# Patient Record
Sex: FEMALE | Race: WHITE | NOT HISPANIC OR LATINO | URBAN - METROPOLITAN AREA
[De-identification: names, ages, dates, MRNs, and addresses within clinical notes are randomized per-mention and may not be internally consistent; named-entity substitution may affect disease eponyms.]

---

## 2017-04-20 ENCOUNTER — OUTPATIENT (OUTPATIENT)
Dept: OUTPATIENT SERVICES | Facility: HOSPITAL | Age: 47
LOS: 1 days | End: 2017-04-20
Payer: COMMERCIAL

## 2017-04-20 PROCEDURE — G0204: CPT | Mod: 26

## 2017-04-20 PROCEDURE — 76641 ULTRASOUND BREAST COMPLETE: CPT | Mod: 26,50

## 2017-04-20 PROCEDURE — 77066 DX MAMMO INCL CAD BI: CPT

## 2017-04-20 PROCEDURE — 76641 ULTRASOUND BREAST COMPLETE: CPT

## 2017-04-27 ENCOUNTER — RESULT REVIEW (OUTPATIENT)
Age: 47
End: 2017-04-27

## 2017-04-28 ENCOUNTER — OUTPATIENT (OUTPATIENT)
Dept: OUTPATIENT SERVICES | Facility: HOSPITAL | Age: 47
LOS: 1 days | End: 2017-04-28
Payer: COMMERCIAL

## 2017-04-28 PROCEDURE — 88305 TISSUE EXAM BY PATHOLOGIST: CPT

## 2017-04-28 PROCEDURE — 19081 BX BREAST 1ST LESION STRTCTC: CPT

## 2017-04-28 PROCEDURE — A4648: CPT

## 2017-04-28 PROCEDURE — 19081 BX BREAST 1ST LESION STRTCTC: CPT | Mod: RT

## 2017-05-02 LAB — SURGICAL PATHOLOGY STUDY: SIGNIFICANT CHANGE UP

## 2018-04-27 ENCOUNTER — EMERGENCY (EMERGENCY)
Facility: HOSPITAL | Age: 48
LOS: 1 days | Discharge: HOME | End: 2018-04-27
Attending: EMERGENCY MEDICINE | Admitting: EMERGENCY MEDICINE
Payer: COMMERCIAL

## 2018-04-27 VITALS
SYSTOLIC BLOOD PRESSURE: 132 MMHG | HEART RATE: 82 BPM | TEMPERATURE: 98 F | RESPIRATION RATE: 16 BRPM | WEIGHT: 167.55 LBS | HEIGHT: 67 IN | OXYGEN SATURATION: 99 % | DIASTOLIC BLOOD PRESSURE: 83 MMHG

## 2018-04-27 DIAGNOSIS — J02.9 ACUTE PHARYNGITIS, UNSPECIFIED: ICD-10-CM

## 2018-04-27 DIAGNOSIS — B34.9 VIRAL INFECTION, UNSPECIFIED: ICD-10-CM

## 2018-04-27 LAB
HMPV RNA SPEC QL NAA+PROBE: DETECTED
RAPID RVP RESULT: DETECTED

## 2018-04-27 PROCEDURE — 99283 EMERGENCY DEPT VISIT LOW MDM: CPT

## 2018-04-27 PROCEDURE — 87798 DETECT AGENT NOS DNA AMP: CPT

## 2018-04-27 PROCEDURE — 87581 M.PNEUMON DNA AMP PROBE: CPT

## 2018-04-27 PROCEDURE — 87633 RESP VIRUS 12-25 TARGETS: CPT

## 2018-04-27 PROCEDURE — 87486 CHLMYD PNEUM DNA AMP PROBE: CPT

## 2018-04-27 NOTE — ED PROVIDER NOTE - OBJECTIVE STATEMENT
47 F no pmh not on medications co cold sx- sore throat cough runny nose malaise weakness  no f/c + ill contacts- 4 yr old son w fever at home  no exac/allev factors  moderate severity

## 2018-07-19 ENCOUNTER — APPOINTMENT (OUTPATIENT)
Dept: ORTHOPEDIC SURGERY | Facility: CLINIC | Age: 48
End: 2018-07-19
Payer: COMMERCIAL

## 2018-07-19 VITALS — HEIGHT: 66.93 IN | RESPIRATION RATE: 16 BRPM | WEIGHT: 167.55 LBS | BODY MASS INDEX: 26.3 KG/M2

## 2018-07-19 DIAGNOSIS — Z78.9 OTHER SPECIFIED HEALTH STATUS: ICD-10-CM

## 2018-07-19 DIAGNOSIS — M18.11 UNILATERAL PRIMARY OSTEOARTHRITIS OF FIRST CARPOMETACARPAL JOINT, RIGHT HAND: ICD-10-CM

## 2018-07-19 PROCEDURE — 99203 OFFICE O/P NEW LOW 30 MIN: CPT

## 2018-07-19 PROCEDURE — 73110 X-RAY EXAM OF WRIST: CPT | Mod: 50

## 2018-08-23 ENCOUNTER — RESULT REVIEW (OUTPATIENT)
Age: 48
End: 2018-08-23

## 2019-07-15 ENCOUNTER — TRANSCRIPTION ENCOUNTER (OUTPATIENT)
Age: 49
End: 2019-07-15

## 2019-12-02 ENCOUNTER — RESULT REVIEW (OUTPATIENT)
Age: 49
End: 2019-12-02

## 2020-04-25 ENCOUNTER — MESSAGE (OUTPATIENT)
Age: 50
End: 2020-04-25

## 2020-05-03 ENCOUNTER — APPOINTMENT (OUTPATIENT)
Dept: DISASTER EMERGENCY | Facility: HOSPITAL | Age: 50
End: 2020-05-03

## 2020-05-04 LAB
SARS-COV-2 IGG SERPL IA-ACNC: 1 INDEX
SARS-COV-2 IGG SERPL QL IA: NEGATIVE

## 2020-10-02 ENCOUNTER — RESULT REVIEW (OUTPATIENT)
Age: 50
End: 2020-10-02

## 2020-11-30 ENCOUNTER — EMERGENCY (EMERGENCY)
Facility: HOSPITAL | Age: 50
LOS: 1 days | Discharge: HOME | End: 2020-11-30
Attending: EMERGENCY MEDICINE | Admitting: EMERGENCY MEDICINE
Payer: COMMERCIAL

## 2020-11-30 VITALS
RESPIRATION RATE: 18 BRPM | TEMPERATURE: 98 F | HEART RATE: 107 BPM | OXYGEN SATURATION: 98 % | SYSTOLIC BLOOD PRESSURE: 150 MMHG | HEIGHT: 67 IN | DIASTOLIC BLOOD PRESSURE: 88 MMHG

## 2020-11-30 VITALS
HEART RATE: 87 BPM | RESPIRATION RATE: 16 BRPM | TEMPERATURE: 98 F | SYSTOLIC BLOOD PRESSURE: 135 MMHG | DIASTOLIC BLOOD PRESSURE: 67 MMHG | OXYGEN SATURATION: 96 %

## 2020-11-30 DIAGNOSIS — Z20.828 CONTACT WITH AND (SUSPECTED) EXPOSURE TO OTHER VIRAL COMMUNICABLE DISEASES: ICD-10-CM

## 2020-11-30 DIAGNOSIS — E83.52 HYPERCALCEMIA: ICD-10-CM

## 2020-11-30 DIAGNOSIS — R07.89 OTHER CHEST PAIN: ICD-10-CM

## 2020-11-30 DIAGNOSIS — J18.9 PNEUMONIA, UNSPECIFIED ORGANISM: ICD-10-CM

## 2020-11-30 LAB
ALBUMIN SERPL ELPH-MCNC: 4.2 G/DL — SIGNIFICANT CHANGE UP (ref 3.3–5)
ALP SERPL-CCNC: 76 U/L — SIGNIFICANT CHANGE UP (ref 40–120)
ALT FLD-CCNC: 18 U/L — SIGNIFICANT CHANGE UP (ref 10–45)
ANION GAP SERPL CALC-SCNC: 12 MMOL/L — SIGNIFICANT CHANGE UP (ref 5–17)
AST SERPL-CCNC: 13 U/L — SIGNIFICANT CHANGE UP (ref 10–40)
BASOPHILS # BLD AUTO: 0.05 K/UL — SIGNIFICANT CHANGE UP (ref 0–0.2)
BASOPHILS NFR BLD AUTO: 0.4 % — SIGNIFICANT CHANGE UP (ref 0–2)
BILIRUB SERPL-MCNC: 0.3 MG/DL — SIGNIFICANT CHANGE UP (ref 0.2–1.2)
BUN SERPL-MCNC: 15 MG/DL — SIGNIFICANT CHANGE UP (ref 7–23)
CALCIUM SERPL-MCNC: 11.2 MG/DL — HIGH (ref 8.4–10.5)
CHLORIDE SERPL-SCNC: 106 MMOL/L — SIGNIFICANT CHANGE UP (ref 96–108)
CO2 SERPL-SCNC: 21 MMOL/L — LOW (ref 22–31)
CREAT SERPL-MCNC: 0.77 MG/DL — SIGNIFICANT CHANGE UP (ref 0.5–1.3)
EOSINOPHIL # BLD AUTO: 0.6 K/UL — HIGH (ref 0–0.5)
EOSINOPHIL NFR BLD AUTO: 5.1 % — SIGNIFICANT CHANGE UP (ref 0–6)
GLUCOSE SERPL-MCNC: 109 MG/DL — HIGH (ref 70–99)
HCG SERPL-ACNC: <0 MIU/ML — SIGNIFICANT CHANGE UP
HCT VFR BLD CALC: 34 % — LOW (ref 34.5–45)
HGB BLD-MCNC: 10.7 G/DL — LOW (ref 11.5–15.5)
IMM GRANULOCYTES NFR BLD AUTO: 0.4 % — SIGNIFICANT CHANGE UP (ref 0–1.5)
LYMPHOCYTES # BLD AUTO: 1.5 K/UL — SIGNIFICANT CHANGE UP (ref 1–3.3)
LYMPHOCYTES # BLD AUTO: 12.8 % — LOW (ref 13–44)
MCHC RBC-ENTMCNC: 26.1 PG — LOW (ref 27–34)
MCHC RBC-ENTMCNC: 31.5 GM/DL — LOW (ref 32–36)
MCV RBC AUTO: 82.9 FL — SIGNIFICANT CHANGE UP (ref 80–100)
MONOCYTES # BLD AUTO: 0.66 K/UL — SIGNIFICANT CHANGE UP (ref 0–0.9)
MONOCYTES NFR BLD AUTO: 5.6 % — SIGNIFICANT CHANGE UP (ref 2–14)
NEUTROPHILS # BLD AUTO: 8.84 K/UL — HIGH (ref 1.8–7.4)
NEUTROPHILS NFR BLD AUTO: 75.7 % — SIGNIFICANT CHANGE UP (ref 43–77)
NRBC # BLD: 0 /100 WBCS — SIGNIFICANT CHANGE UP (ref 0–0)
NT-PROBNP SERPL-SCNC: 21 PG/ML — SIGNIFICANT CHANGE UP (ref 0–300)
PLATELET # BLD AUTO: 193 K/UL — SIGNIFICANT CHANGE UP (ref 150–400)
POTASSIUM SERPL-MCNC: 4.2 MMOL/L — SIGNIFICANT CHANGE UP (ref 3.5–5.3)
POTASSIUM SERPL-SCNC: 4.2 MMOL/L — SIGNIFICANT CHANGE UP (ref 3.5–5.3)
PROT SERPL-MCNC: 7.3 G/DL — SIGNIFICANT CHANGE UP (ref 6–8.3)
RBC # BLD: 4.1 M/UL — SIGNIFICANT CHANGE UP (ref 3.8–5.2)
RBC # FLD: 13.9 % — SIGNIFICANT CHANGE UP (ref 10.3–14.5)
SARS-COV-2 RNA SPEC QL NAA+PROBE: SIGNIFICANT CHANGE UP
SODIUM SERPL-SCNC: 139 MMOL/L — SIGNIFICANT CHANGE UP (ref 135–145)
TROPONIN T SERPL-MCNC: <0.01 NG/ML — SIGNIFICANT CHANGE UP (ref 0–0.01)
WBC # BLD: 11.7 K/UL — HIGH (ref 3.8–10.5)
WBC # FLD AUTO: 11.7 K/UL — HIGH (ref 3.8–10.5)

## 2020-11-30 PROCEDURE — 84702 CHORIONIC GONADOTROPIN TEST: CPT

## 2020-11-30 PROCEDURE — 93005 ELECTROCARDIOGRAM TRACING: CPT

## 2020-11-30 PROCEDURE — 84484 ASSAY OF TROPONIN QUANT: CPT

## 2020-11-30 PROCEDURE — 99284 EMERGENCY DEPT VISIT MOD MDM: CPT | Mod: 25

## 2020-11-30 PROCEDURE — 71275 CT ANGIOGRAPHY CHEST: CPT

## 2020-11-30 PROCEDURE — 71275 CT ANGIOGRAPHY CHEST: CPT | Mod: 26

## 2020-11-30 PROCEDURE — 83880 ASSAY OF NATRIURETIC PEPTIDE: CPT

## 2020-11-30 PROCEDURE — 85025 COMPLETE CBC W/AUTO DIFF WBC: CPT

## 2020-11-30 PROCEDURE — 93010 ELECTROCARDIOGRAM REPORT: CPT

## 2020-11-30 PROCEDURE — 36415 COLL VENOUS BLD VENIPUNCTURE: CPT

## 2020-11-30 PROCEDURE — 80053 COMPREHEN METABOLIC PANEL: CPT

## 2020-11-30 PROCEDURE — 99285 EMERGENCY DEPT VISIT HI MDM: CPT

## 2020-11-30 PROCEDURE — 87635 SARS-COV-2 COVID-19 AMP PRB: CPT

## 2020-11-30 NOTE — ED PROVIDER NOTE - PHYSICAL EXAMINATION
General: Patient is well developed and well nourished. Patient is alert and oriented to person, place and date. Patient is sitting stretcher and appears in no acute distress.  HEENT: Head is normocephalic and atraumatic. Pupils are equal, round and reactive. Extraocular movements intact. No evidence of nystagmus, conjunctival injection, or scleral icterus. External ears symmetric without evidence of discharge.  Nose is symmetric, non-tender, patent without evidence of discharge. Teeth in good repair. Uvula midline.   Neck: Supple with no evidence of lymphadenopathy.  Full range of motion.  Heart: Regular rate and rhythm. No murmurs, rubs or gallops.   Lungs: Clear to auscultation bilaterally with equal chest expansion. No note of wheezes, rhonchi, rales. Equal chest expansion. No note of retractions.  Abdomen: Bowel sounds present in all four quadrants. Soft, non-tender, non-distended without signs of masses, rebound or guarding. No note of hepatosplenomegaly. No CVA tenderness bilaterally. Negative Morton sign or McBurney's.  Musculoskeletal: No edema, erythema, ecchymosis, atrophy or deformity. F No clubbing or cyanosis. No point tenderness to palpation.   Neuro: GCS 15. Moving all extremities. Strength is 5/5 arms and legs bilaterally. Sensation intact in extremities. gait steady   Skin: Warm, dry and intact without evidence of rashes, bruising, pallor, jaundice or cyanosis.   Psych: Mood and affect appropriate.

## 2020-11-30 NOTE — ED PROVIDER NOTE - ATTENDING CONTRIBUTION TO CARE
51 yo hx of allergic rhinits w cough, sob, chest pain, palpitations. noted to be 88 on RA at home. Well appearing, nad, nc/at, lung cta, heart reg, abd soft, nt, ext no gross deformity, no gross neuro deficits, pending labs, CTA chest, reassess.

## 2020-11-30 NOTE — ED PROVIDER NOTE - CLINICAL SUMMARY MEDICAL DECISION MAKING FREE TEXT BOX
51 y/o F pt presents to ED with palpitations, cough, and shortness of breath. Had O2 saturation of 88% on Saturday that has improved. On exam, pt well appearing, non-toxic, heart rate regular, lungs clear with O2 saturation of 98% on room air, and EKG with Q wave in leads 2, 3, and AVF. Will obtain labs, CT, and reassess. 49 y/o F pt presents to ED with palpitations, cough, and shortness of breath. Had O2 saturation of 88% on Saturday that has improved. On exam, pt well appearing, non-toxic, heart rate regular, lungs clear with O2 saturation of 98% on room air, and EKG with Q wave in leads 2, 3, and AVF. amb pulse ox 96-97 percent. Will obtain labs, CT, and reassess. ct shows apical pneumonitis no pe. plan to dc home, ? 2/2 covid. plan to monitor pulse ox and follow up with pulm. At this time, the evidence for any other entities in the differential is insufficient to justify any further testing. This was discussed and explained to the patient. It was advised that persistent or worsening symptoms would require further evaluation. This was discussed with the patient and family using shared decision making. ED evaluation and management discussed with the patient and family (if available) in detail.  Close PMD follow up encouraged.  Strict ED return instructions discussed in detail and patient given the opportunity to ask any questions about their discharge diagnosis and instructions. Patient verbalized understanding. Patient is agreeable to plan. 49 y/o F pt presents to ED with palpitations, cough, and shortness of breath. Had O2 saturation of 88% on Saturday that has improved. On exam, pt well appearing, non-toxic, heart rate regular, lungs clear with O2 saturation of 98% on room air, and EKG with Q wave in leads 2, 3, and AVF. amb pulse ox 96-97 percent. Will obtain labs, CT, and reassess. ct shows apical pneumonitis no pe. low suspicion infectious etiology-- do not believe steroid or abx necessitated at this time. plan to dc home, ? 2/2 covid. plan to monitor pulse ox and follow up with pulm. At this time, the evidence for any other entities in the differential is insufficient to justify any further testing. This was discussed and explained to the patient. It was advised that persistent or worsening symptoms would require further evaluation. This was discussed with the patient and family using shared decision making. ED evaluation and management discussed with the patient and family (if available) in detail.  Close PMD follow up encouraged.  Strict ED return instructions discussed in detail and patient given the opportunity to ask any questions about their discharge diagnosis and instructions. Patient verbalized understanding. Patient is agreeable to plan.

## 2020-11-30 NOTE — ED PROVIDER NOTE - OBJECTIVE STATEMENT
51 y/o F pt with PMHx of allergic rhinitis and no pertinent PSHx presents to ED c/o cough and shortness of breath x 3 days with assocated palpitations and chest pain since 11/28. Pt states she has been monitoring her O2 since 11/28, was at 88% then, but is at 96% today. Pt is unsure if her sx are due to her seasonal allergies. Denies fevers, chills, nausea and vomiting, abdominal pain, hx of blood clots, use of oral contraceptives, or any other acute complaints.

## 2020-11-30 NOTE — ED ADULT NURSE NOTE - SUICIDE SCREENING QUESTION 1
Did patient go to ER?  Note from urgent care RN said she was on her way to the ER. If no, appointment here ok. Since tomorrow is Friday I would be inclined to have her get an U/S first to check IUD placement then see me in clinic.    No

## 2020-11-30 NOTE — ED PROVIDER NOTE - CARE PLAN
Principal Discharge DX:	Pneumonitis   Principal Discharge DX:	Pneumonitis  Secondary Diagnosis:	Hypercalcemia

## 2020-11-30 NOTE — ED ADULT NURSE NOTE - OBJECTIVE STATEMENT
Pt is a 50y female presented to ED for covid testing. Pt states had SOB over weekend, noticed her SAT was around **% on saturday and gradually increased throughout weekend. PT denies CP, states minor cough. PT admits to + serology test for COVID on OCT 1st.

## 2020-11-30 NOTE — ED PROVIDER NOTE - NSFOLLOWUPINSTRUCTIONS_ED_ALL_ED_FT
please continue to monitor your pulse ox    please come back to the emergency room for any worsening of symptoms    please follow up with doctor (your appointment will be facilitated by referral coordinator)    please follow up with doctor regarding you elevated calcium today          PNEUMONITIS - General Information           Pneumonitis    WHAT YOU NEED TO KNOW:    What is pneumonitis? Pneumonitis is inflammation of your lungs. The inflammation can make it hard to breathe and prevent you from getting enough oxygen. Anything that irritates your lung tissues can lead to pneumonitis. The longer you are exposed, the more damage your lungs will develop. Pneumonitis can last a short time or become chronic.    The Lungs         What causes or increases my risk for pneumonitis?   •Radiation for cancer treatment      •Mold, a virus, or bacteria      •Chemicals such as pesticides, pool chemicals, or household       •Certain medicines, such as antibiotics, cancer medicines, and some heart medicines      •Smoke from a fire, or dust from grains, such as on a farm      •Food or liquid that you inhale      •Exposure to bird feathers or droppings      What are the signs and symptoms of pneumonitis?   •A cough or trouble breathing      •A wheeze or chirp as you breathe in      •Runny nose, watery eyes, or a sore throat      •Pain, tightness, or burning in your throat or chest      •Sudden headache, dizziness, trouble thinking clearly, or feeling faint      •Blue lips or fingernails      •Fever, chills, and muscle aches      •Colds or lung infections that happen often      •Weight loss      How is pneumonitis diagnosed? Your healthcare provider will ask about your symptoms and when they began. He or she will ask if you know what you were exposed to. You may also need any of the following tests:   •Blood tests are used to check the amounts of oxygen and carbon dioxide in your blood. The results can tell healthcare providers how well your lungs are working. Blood tests may also be used to check for signs of an allergic reaction. Your body may be creating inflammation as a response to something you are allergic to.      •X-rays or CT scans may be used to check for an infection or fluid in your lungs. The pictures may also show fibrosis (scarring of your lung tissues).      •Pulmonary function tests (PFTs) will show how much oxygen your body is getting. You breathe into a mouthpiece connected to a machine. The machine measures how much air you breathe in and out over a certain amount of time.      •A bronchoscopy is a procedure to look inside your airway. A bronchoscope (thin tube with a light) is inserted into your mouth and moved down your throat to your airway.      •A sample of lung tissue may be sent to a lab for tests. The sample may be taken during a bronchoscopy, through surgery, or frozen and removed through a procedure called cryobiopsy.      How is pneumonitis treated? Your symptoms may go away without treatment. If your symptoms are severe or do not go away, you may need any of the following:   •Medicines decrease coughing and inflammation, open airways, and make it easier for you to breathe. You may also need medicine to treat a bacterial infection.      •Oxygen may be given if the level of oxygen in your blood gets too low.      What can I do to manage pneumonitis?   •Rest as directed. Keep the head of your bed raised to help you breathe easier. You can also raise your head and shoulders up on pillows or rest in a reclining chair.      •Do deep breathing and coughing. Deep breaths help open your airway and clear mucus or congestion. Take a deep breath and hold it for as long as you can. Let the air out and then cough strongly. You may be given an incentive spirometer to help you take deep breaths. Put the plastic piece in your mouth. Take a slow, deep breath. Then let the air out and cough. Repeat these steps as directed by your healthcare provider.  How to use and Incentive Spirometer           •Do not smoke. Avoid secondhand smoke. Nicotine and other chemicals in cigarettes and cigars can make it harder for your lung inflammation to get better. Ask your healthcare provider for information if you currently smoke and need help to quit. E-cigarettes or smokeless tobacco still contain nicotine. Talk to your healthcare provider before you use these products.      •Get the flu vaccine. The flu can become serious in anyone who has a lung condition. Get the flu vaccine as soon as recommended each year, usually in September or October. You may also need the pneumococcal vaccine to prevent pneumonia.      •Do not drink alcohol when you are sick. Alcohol dulls your urge to cough and sneeze. Alcohol also causes your body to lose fluid. This can make the mucus in your lungs thicker and harder to cough up.      •Drink more liquids. Liquids help keep your air passages moist and better able to get rid of germs and other irritants. Ask your healthcare provider how much liquid to drink each day and which liquids are best for you.      •Use a cool mist humidifier. A humidifier will help increase air moisture in your home. This may make it easier for you to breathe and help decrease your cough.      •Go to pulmonary rehabilitation (rehab) as directed. Your healthcare provider may recommend rehab if you develop chronic pneumonitis. A rehab therapist can teach you breathing exercises to help keep your airway open.      What can I do to prevent pneumonitis?   •Avoid anything that irritates your lungs. Examples include smoke, dust, and fumes. You may need to wear a mask if you work with something that irritates your lungs.      •Be careful with household . Do not combine products that contain bleach and chlorine. Do not use these chemicals in closed spaces. Open a window or door to keep the air flowing.      When should I seek immediate care?   •You have trouble breathing.      •You faint or cannot think clearly.      •You cough up blood.      •Your lips or fingernails turn blue or gray.      •Your lips, tongue, or throat swell and you have trouble breathing or swallowing.      When should I call my doctor?   •You have a fever that lasts more than 3 days, even with treatment.      •Your chest pain or breathing problems do not go away or get worse.      •Your cough does not get better with treatment.      •You vomit or have diarrhea.      •You have questions or concerns about your condition or care.      CARE AGREEMENT:    You have the right to help plan your care. Learn about your health condition and how it may be treated. Discuss treatment options with your healthcare providers to decide what care you want to receive. You always have the right to refuse treatment.        © Copyright [employer] 2020           back to top                          © Copyright [employer] 2020

## 2020-11-30 NOTE — ED PROVIDER NOTE - PATIENT PORTAL LINK FT
You can access the FollowMyHealth Patient Portal offered by [facility] by registering at the following website: [email or web address]. By joining [facility]’s FollowMyHealth portal, you will also be able to view your health information using other applications (apps) compatible with our system.

## 2020-12-01 PROBLEM — J30.9 ALLERGIC RHINITIS, UNSPECIFIED: Chronic | Status: ACTIVE | Noted: 2020-11-30

## 2020-12-02 ENCOUNTER — APPOINTMENT (OUTPATIENT)
Dept: PULMONOLOGY | Facility: CLINIC | Age: 50
End: 2020-12-02
Payer: COMMERCIAL

## 2020-12-02 VITALS
BODY MASS INDEX: 25.61 KG/M2 | SYSTOLIC BLOOD PRESSURE: 120 MMHG | HEIGHT: 66.93 IN | HEART RATE: 82 BPM | DIASTOLIC BLOOD PRESSURE: 84 MMHG | TEMPERATURE: 97 F | OXYGEN SATURATION: 98 % | WEIGHT: 163.14 LBS

## 2020-12-02 PROCEDURE — 99072 ADDL SUPL MATRL&STAF TM PHE: CPT

## 2020-12-02 PROCEDURE — 71046 X-RAY EXAM CHEST 2 VIEWS: CPT

## 2020-12-02 PROCEDURE — 99204 OFFICE O/P NEW MOD 45 MIN: CPT | Mod: 25

## 2020-12-02 NOTE — HISTORY OF PRESENT ILLNESS
[TextBox_4] : 12/02/2020: Referred by ED for pneumonitis. Physician with background of allergies, rhinitis. Occasionally has what she terms an asthma attack, though never given a formal dx of asthma; this is rare. On Saturday she was walking and felt suddenly dyspneic. Able to keep walking and came home and checked sat and was 88%. Uncomfortable all night. Sunday sat was better, felt better but still dyspneic, called [employer] who told her to get tested the next day. Seen in ED testing site 11/30, was tachy and a little dyspneic but not hypoxic, was seen in ED and had CTA. Of note had started Xiidra a week earlier, a rare listed side effect is dyspnea, but her doctor has never seen that as a side effect. She remains dyspneic, orthopneic. Has cough, no fever. Never smoker, no inhalations. Lives [place] w a cat, no feathers. May see mold in her apartment.\par \par In terms of Covid Abs, did have an episode of tiredness in April but no other significant illness this year. Her 6 year old did have a high fever and refused to walk complaining of leg pain on April 6 and had been exposed to no one but the patient and her . He recovered spontaneously in one day.  is a hospitalist and reported change in taste around the same time.\par \par In 2012 she vacationed on [place] and her cat became severely ill after mouthing a rock she collected from [place], where there are wild horses. The vets suspected Rhodococcus infection. She of course did not put any rocks in her mouth and she was not ill at that time.\par

## 2020-12-02 NOTE — ASSESSMENT
[FreeTextEntry1] : Data reviewed:\par \par Labs 11/30/20 notable for WBC 11.5, anemia, and eos 600/ul\par \par Covid Ab neg in 4/2020 and pos in 10/2020\par Covid swab 11/30/20: negative\par \par CTA [facility] 11/30/20 personally reviewed : no PE, and gerri apical ground glass as well as diffuse airway wall thickening\par PA/lat CXR in office 12/02/2020 : faint apical infiltrates\par \par Impression:\par Pneumonia/pneumonitis\par \par Plan:\par Likely diagnosis with acute dyspnea/cough and pulmonary infiltrates is CAP.\par I doubt this is Covid. The timeline doesn't make sense, with positive Abs in October and only ill starting 5 days ago. The lack of rales and the presence of airway disease also make less sense for Covid, and she of course had a negative swab on 11/30 though that is not perfectly sensitive.\par Rhodococcus infection is rare in immunocompetent adults and I'm unaware of a years long latency period.\par Alternatively she could have a noninfectious pneumonia: , EP, etc.\par Will treat for CAP w Levaquin x 1 week and follow up 1-2 weeks.\par If not improved then will need bronchoscopy.\par Call for any worsening.\par Had flu shot.

## 2020-12-07 ENCOUNTER — RESULT REVIEW (OUTPATIENT)
Age: 50
End: 2020-12-07

## 2020-12-16 ENCOUNTER — APPOINTMENT (OUTPATIENT)
Dept: PULMONOLOGY | Facility: CLINIC | Age: 50
End: 2020-12-16
Payer: COMMERCIAL

## 2020-12-16 VITALS
SYSTOLIC BLOOD PRESSURE: 116 MMHG | DIASTOLIC BLOOD PRESSURE: 80 MMHG | TEMPERATURE: 97.5 F | HEIGHT: 66.93 IN | HEART RATE: 80 BPM | OXYGEN SATURATION: 98 % | WEIGHT: 163 LBS | BODY MASS INDEX: 25.58 KG/M2

## 2020-12-16 DIAGNOSIS — J18.9 PNEUMONIA, UNSPECIFIED ORGANISM: ICD-10-CM

## 2020-12-16 PROCEDURE — 99072 ADDL SUPL MATRL&STAF TM PHE: CPT

## 2020-12-16 PROCEDURE — 71046 X-RAY EXAM CHEST 2 VIEWS: CPT

## 2020-12-16 PROCEDURE — 99213 OFFICE O/P EST LOW 20 MIN: CPT | Mod: 25

## 2020-12-16 NOTE — HISTORY OF PRESENT ILLNESS
[TextBox_4] : 12/02/2020: Referred by ED for pneumonitis. Physician with background of allergies, rhinitis. Occasionally has what she terms an asthma attack, though never given a formal dx of asthma; this is rare. On Saturday she was walking and felt suddenly dyspneic. Able to keep walking and came home and checked sat and was 88%. Uncomfortable all night. Sunday sat was better, felt better but still dyspneic, called [employer] who told her to get tested the next day. Seen in ED testing site 11/30, was tachy and a little dyspneic but not hypoxic, was seen in ED and had CTA. Of note had started Xiidra a week earlier, a rare listed side effect is dyspnea, but her doctor has never seen that as a side effect. She remains dyspneic, orthopneic. Has cough, no fever. Never smoker, no inhalations. Lives [place] w a cat, no feathers. May see mold in her apartment.\par \par In terms of Covid Abs, did have an episode of tiredness in April but no other significant illness this year. Her 6 year old did have a high fever and refused to walk complaining of leg pain on April 6 and had been exposed to no one but the patient and her . He recovered spontaneously in one day.  is a hospitalist and reported change in taste around the same time.\par \par In 2012 she vacationed on [place] and her cat became severely ill after mouthing a rock she collected from [place], where there are wild horses. The vets suspected Rhodococcus infection. She of course did not put any rocks in her mouth and she was not ill at that time.\par \par I gave her a course of Levaquin for a presumed CAP and asked her to have short interval follow up.\par \par 12/16/20: She feels definitely better. She is no longer waking up with dyspnea. She has not been hypoxic. She has a mild chronic cough, nothing atypical fo rher. She still feels fatigued and has bilateral upper back pain that is not worse with breathing, is constant and doesn't feel muscular. She completed Levaquin without adverse effects.\par

## 2020-12-16 NOTE — ASSESSMENT
[FreeTextEntry1] : Data reviewed:\par \par Labs 11/30/20 notable for WBC 11.5, anemia, and eos 600/ul\par Labs 12/20 notable for normal WBC in 7s and Hgb in 10s and still w eosinophilia\par \par Covid Ab neg in 4/2020 and pos in 10/2020\par Covid swab 11/30/20: negative\par \par CTA [facility] 11/30/20 personally reviewed : no PE, and gerri apical ground glass as well as diffuse airway wall thickening\par PA/lat CXR in office 12/16/20: resolution of previous atx, otherwise looks clear\par \par Impression:\par Pneumonia/pneumonitis\par \par Plan:\par Watchful waiting for now.\par Improved after Abx.\par Repeat CT chest in another 2-3 weeks and follow up at that time.\par Call for any worsening in the interim.

## 2020-12-28 ENCOUNTER — OUTPATIENT (OUTPATIENT)
Dept: OUTPATIENT SERVICES | Facility: HOSPITAL | Age: 50
LOS: 1 days | End: 2020-12-28
Payer: COMMERCIAL

## 2020-12-28 ENCOUNTER — APPOINTMENT (OUTPATIENT)
Dept: CT IMAGING | Facility: HOSPITAL | Age: 50
End: 2020-12-28
Payer: COMMERCIAL

## 2020-12-28 PROCEDURE — 71250 CT THORAX DX C-: CPT | Mod: 26

## 2020-12-28 PROCEDURE — 71250 CT THORAX DX C-: CPT

## 2021-01-20 ENCOUNTER — APPOINTMENT (OUTPATIENT)
Dept: ENDOCRINOLOGY | Facility: CLINIC | Age: 51
End: 2021-01-20
Payer: COMMERCIAL

## 2021-01-20 VITALS
HEIGHT: 67 IN | SYSTOLIC BLOOD PRESSURE: 113 MMHG | HEART RATE: 69 BPM | WEIGHT: 169 LBS | BODY MASS INDEX: 26.53 KG/M2 | DIASTOLIC BLOOD PRESSURE: 72 MMHG

## 2021-01-20 PROCEDURE — 99072 ADDL SUPL MATRL&STAF TM PHE: CPT

## 2021-01-20 PROCEDURE — 99204 OFFICE O/P NEW MOD 45 MIN: CPT

## 2021-01-20 RX ORDER — LIFITEGRAST 50 MG/ML
5 SOLUTION/ DROPS OPHTHALMIC
Refills: 0 | Status: DISCONTINUED | COMMUNITY
End: 2021-01-20

## 2021-01-20 RX ORDER — IBUPROFEN 200 MG/1
TABLET, COATED ORAL
Refills: 0 | Status: DISCONTINUED | COMMUNITY
End: 2021-01-20

## 2021-01-20 RX ORDER — LEVOFLOXACIN 500 MG/1
500 TABLET, FILM COATED ORAL DAILY
Qty: 7 | Refills: 0 | Status: DISCONTINUED | COMMUNITY
Start: 2020-12-02 | End: 2021-01-20

## 2021-01-22 LAB
24R-OH-CALCIDIOL SERPL-MCNC: 94.2 PG/ML
25(OH)D3 SERPL-MCNC: 27.9 NG/ML
ALBUMIN MFR SERPL ELPH: 59 %
ALBUMIN SERPL-MCNC: 4.1 G/DL
ALBUMIN/GLOB SERPL: 1.5 RATIO
ALDOSTERONE SERUM: 11.3 NG/DL
ALPHA1 GLOB MFR SERPL ELPH: 4.3 %
ALPHA1 GLOB SERPL ELPH-MCNC: 0.3 G/DL
ALPHA2 GLOB MFR SERPL ELPH: 9.6 %
ALPHA2 GLOB SERPL ELPH-MCNC: 0.7 G/DL
ANION GAP SERPL CALC-SCNC: 11 MMOL/L
B-GLOBULIN MFR SERPL ELPH: 11.9 %
B-GLOBULIN SERPL ELPH-MCNC: 0.8 G/DL
BUN SERPL-MCNC: 18 MG/DL
CALCIUM SERPL-MCNC: 11 MG/DL
CALCIUM SERPL-MCNC: 11 MG/DL
CHLORIDE SERPL-SCNC: 108 MMOL/L
CO2 SERPL-SCNC: 22 MMOL/L
CREAT SERPL-MCNC: 0.8 MG/DL
GAMMA GLOB FLD ELPH-MCNC: 1 G/DL
GAMMA GLOB MFR SERPL ELPH: 15.2 %
GLUCOSE SERPL-MCNC: 108 MG/DL
INTERPRETATION SERPL IEP-IMP: NORMAL
PARATHYROID HORMONE INTACT: 114 PG/ML
POTASSIUM SERPL-SCNC: 4.6 MMOL/L
PROT SERPL-MCNC: 6.9 G/DL
PROT SERPL-MCNC: 6.9 G/DL
RENIN PLASMA: 3.2 PG/ML
SODIUM SERPL-SCNC: 141 MMOL/L
THYROGLOB AB SERPL-ACNC: <20 IU/ML
THYROPEROXIDASE AB SERPL IA-ACNC: 15.2 IU/ML
TSH SERPL-ACNC: 3.32 UIU/ML

## 2021-01-28 NOTE — CONSULT LETTER
[Dear  ___] : Dear  [unfilled], [Consult Letter:] : I had the pleasure of evaluating your patient, [unfilled]. [Please see my note below.] : Please see my note below. [Consult Closing:] : Thank you very much for allowing me to participate in the care of this patient.  If you have any questions, please do not hesitate to contact me. [Sincerely,] : Sincerely, [FreeTextEntry1] : Dr. De Leon has hypercalcemia due to primary hyperparathyroidism. I am sending her for 24 hour urine calcium and bone density to see if she should undergo parathyroidectomy.  I probably will lean toward recommending surgery, given her young age.\par She also has borderline TSH (normal now) and incidental/non functional adrenal adenoma.\par  [FreeTextEntry3] : Zoe Monson MD\par Division of Endocrinology\par [facility] Physician [facility]

## 2021-01-28 NOTE — REASON FOR VISIT
[Initial Evaluation] : an initial evaluation [Hypercalcemia] : hypercalcemia [Adrenal Evaluation/Adrenal Disorder] : adrenal evaluation/adrenal disorder [Hypothyroidism] : hypothyroidism [FreeTextEntry2] : Dr Ailyn Lazcano

## 2021-01-28 NOTE — PHYSICAL EXAM
[Alert] : alert [Healthy Appearance] : healthy appearance [No Acute Distress] : no acute distress [No Proptosis] : no proptosis [No Lid Lag] : no lid lag [Normal Hearing] : hearing was normal [No LAD] : no lymphadenopathy [Thyroid Not Enlarged] : the thyroid was not enlarged [Clear to Auscultation] : lungs were clear to auscultation bilaterally [Normal S1, S2] : normal S1 and S2 [Regular Rhythm] : with a regular rhythm [No Stigmata of Cushings Syndrome] : no stigmata of Cushings Syndrome [Normal Affect] : the affect was normal [Normal Mood] : the mood was normal [Acanthosis Nigricans] : no acanthosis nigricans [de-identified] : no plethora, moon facies, dorsal or supraclavicular fat pads

## 2021-01-28 NOTE — DATA REVIEWED
[FreeTextEntry1] : 12/20: Ca 11.3, K 4.3, ferritin 13, iron 22, 25D 25.8, TSH 5.47\par 11/20: Ca 11.2, A1c 5.5%, K 4.2, tot protin 7.3\par \par CT, PE protocol, 11/20:\par L adrenal adenoma, appearing benign (with fat). 1.5cm

## 2021-01-28 NOTE — HISTORY OF PRESENT ILLNESS
[FreeTextEntry1] : Went to ED at end of November with SOB, was diagnosed with community acquired pneumonia but was found to have hypercalcemia and adrenal adenoma on CT (done to rule out PE)\par Then she saw GYN one week later, and hypercalcemia confirmed, and she also had elevated TSH.\par \par Hypercalcemia:\par Pt looked through old labs and noted that calcium was near upper end of normal range since [id number].  Now calcium is over 11.0.  no polyuria, polydipsia, GI symptoms.  maybe mood changes?   since she became a mother (7 years ago).  some urinary frequency. no history of kidney stones.\par \par Elevated TSH.\par took levothyroxine during pregnancy but then stopped after wards.\par hair loss for past 7 years (since pregnancy).  no cold intolerance or constipation.\par gaining weight the past 7 years.\par fatigue since April -- thinks she had Covid infection then (daughter had fever and  had anosmia) and her Covid Ab was positive in Oct.  Now she has been vaccinated for Covid.\par older sister has thyroid disease (goiter and hypothyroidism)\par \par Adrenal adenoma, found incidentally:\par periods reguar but in September, period would not stop.  had curettage, path: "proliferative endometrium"\par no striae, easy bruising or mood lability\par no history of hypokalemia or Hypertension\par \par \par other ROS:\par dry eyes,  tried Xiidra but thought she was having reaction to it\par H pylori (+), saw GI  in 2020 for abdominal pain, found to gallbladder polyp, which was stable on follow up\par FH:  father  of colon cancer, at age 64; father also had diabetes, diagnosed at age 45\par mother  of adenocystic carcinoma of salivary gland\par \par Currently is taking no meds.

## 2021-01-28 NOTE — ASSESSMENT
[FreeTextEntry1] : Hypercalcemia.\par Most common cause is primary hyperpara:  keith check PTH and vitamin D metabolites.  If labs are consistent with primary hyperpara, then will send for 24 hour urine for calcium and bone density.  Explained that only cure for primary hyperpara is surgery (parathyroidectomy) but not all patients need surgery.  If calcium is < 11.5, and bone density is not in osteoporosis range,  GFR not reduced by more than 30%, and 24 hour urine < 350mg/24 hours, then calcium can be monitored.  Avoid dehydration, as dehydration may increase calcium further. Keep vitamin D replete, goal 25 D > 25 ng/ml. \par \par Elevated TSH.  I suspect underlying Hashimoto's\par recheck TSH today, with thyroid antibodies. She is still desiring pregnancy, so will start thyroxine if repeat TSH is over 4.0 today to normalize TSH.  Once/if pregnant, then will increase dosing to 9 tab per week.\par \par Adrenal adenoma probably non functional\par unlikely to be pheo due to appearance on CT but will check metanephrines and check lelo/renin ratio.\par also will send for 1mg dex suppression test.\par if tests are normal, can monitor periodically (every few years, sooner if develops new symptoms such as uncontrolled HTN).

## 2021-02-03 LAB
METANEPHRINE, PL: 18.6 PG/ML
NORMETANEPHRINE, PL: 97.8 PG/ML

## 2021-03-02 ENCOUNTER — LABORATORY RESULT (OUTPATIENT)
Age: 51
End: 2021-03-02

## 2021-03-02 ENCOUNTER — NON-APPOINTMENT (OUTPATIENT)
Age: 51
End: 2021-03-02

## 2021-03-02 ENCOUNTER — APPOINTMENT (OUTPATIENT)
Dept: INTERNAL MEDICINE | Facility: CLINIC | Age: 51
End: 2021-03-02
Payer: COMMERCIAL

## 2021-03-02 VITALS
OXYGEN SATURATION: 99 % | HEIGHT: 67 IN | TEMPERATURE: 98.9 F | DIASTOLIC BLOOD PRESSURE: 97 MMHG | HEART RATE: 88 BPM | SYSTOLIC BLOOD PRESSURE: 145 MMHG | BODY MASS INDEX: 26.21 KG/M2 | WEIGHT: 167 LBS

## 2021-03-02 DIAGNOSIS — J30.1 ALLERGIC RHINITIS DUE TO POLLEN: ICD-10-CM

## 2021-03-02 DIAGNOSIS — Z80.0 FAMILY HISTORY OF MALIGNANT NEOPLASM OF DIGESTIVE ORGANS: ICD-10-CM

## 2021-03-02 DIAGNOSIS — M19.049 PRIMARY OSTEOARTHRITIS, UNSPECIFIED HAND: ICD-10-CM

## 2021-03-02 DIAGNOSIS — Z80.9 FAMILY HISTORY OF MALIGNANT NEOPLASM, UNSPECIFIED: ICD-10-CM

## 2021-03-02 DIAGNOSIS — Z23 ENCOUNTER FOR IMMUNIZATION: ICD-10-CM

## 2021-03-02 PROCEDURE — 99072 ADDL SUPL MATRL&STAF TM PHE: CPT

## 2021-03-02 PROCEDURE — 99204 OFFICE O/P NEW MOD 45 MIN: CPT | Mod: 25

## 2021-03-02 PROCEDURE — G0442 ANNUAL ALCOHOL SCREEN 15 MIN: CPT

## 2021-03-02 PROCEDURE — 36415 COLL VENOUS BLD VENIPUNCTURE: CPT

## 2021-03-02 PROCEDURE — G0444 DEPRESSION SCREEN ANNUAL: CPT

## 2021-03-02 PROCEDURE — 93000 ELECTROCARDIOGRAM COMPLETE: CPT | Mod: 59

## 2021-03-02 RX ORDER — UBIDECARENONE/VIT E ACET 100MG-5
50 MCG CAPSULE ORAL
Refills: 0 | Status: ACTIVE | COMMUNITY
Start: 2021-03-02

## 2021-03-02 RX ORDER — DEXAMETHASONE 1 MG/1
1 TABLET ORAL
Qty: 1 | Refills: 0 | Status: DISCONTINUED | COMMUNITY
Start: 2021-01-20 | End: 2021-03-02

## 2021-03-02 RX ORDER — FERROUS SULFATE 325(65) MG
325 (65 FE) TABLET ORAL
Refills: 0 | Status: ACTIVE | COMMUNITY
Start: 2021-03-02

## 2021-03-03 LAB
BASOPHILS # BLD AUTO: 0.04 K/UL
BASOPHILS NFR BLD AUTO: 0.7 %
CA-I SERPL-SCNC: 1.5 MMOL/L
EOSINOPHIL # BLD AUTO: 0.41 K/UL
EOSINOPHIL NFR BLD AUTO: 7 %
ESTIMATED AVERAGE GLUCOSE: 111 MG/DL
HBA1C MFR BLD HPLC: 5.5 %
HCT VFR BLD CALC: 37 %
HGB BLD-MCNC: 11.6 G/DL
IMM GRANULOCYTES NFR BLD AUTO: 0.2 %
LYMPHOCYTES # BLD AUTO: 1.64 K/UL
LYMPHOCYTES NFR BLD AUTO: 28 %
MAN DIFF?: NORMAL
MCHC RBC-ENTMCNC: 27.6 PG
MCHC RBC-ENTMCNC: 31.4 GM/DL
MCV RBC AUTO: 88.1 FL
MONOCYTES # BLD AUTO: 0.4 K/UL
MONOCYTES NFR BLD AUTO: 6.8 %
NEUTROPHILS # BLD AUTO: 3.36 K/UL
NEUTROPHILS NFR BLD AUTO: 57.3 %
PLATELET # BLD AUTO: 219 K/UL
RBC # BLD: 4.2 M/UL
RBC # FLD: 15.1 %
RHEUMATOID FACT SER QL: <10 IU/ML
WBC # FLD AUTO: 5.86 K/UL

## 2021-03-04 LAB
25(OH)D3 SERPL-MCNC: 27.3 NG/ML
ALBUMIN SERPL ELPH-MCNC: 4.7 G/DL
ALP BLD-CCNC: 88 U/L
ALT SERPL-CCNC: 15 U/L
ANION GAP SERPL CALC-SCNC: 10 MMOL/L
AST SERPL-CCNC: 17 U/L
B BURGDOR IGG+IGM SER QL IB: NORMAL
BILIRUB SERPL-MCNC: 0.4 MG/DL
BUN SERPL-MCNC: 15 MG/DL
CALCIUM SERPL-MCNC: 10.8 MG/DL
CALCIUM SERPL-MCNC: 10.8 MG/DL
CHLORIDE SERPL-SCNC: 104 MMOL/L
CHOLEST SERPL-MCNC: 192 MG/DL
CO2 SERPL-SCNC: 22 MMOL/L
CREAT SERPL-MCNC: 0.74 MG/DL
DEPRECATED KAPPA LC FREE/LAMBDA SER: 1.34 RATIO
GLUCOSE SERPL-MCNC: 98 MG/DL
HCV AB SER QL: NONREACTIVE
HCV S/CO RATIO: 0.12 S/CO
HDLC SERPL-MCNC: 89 MG/DL
IGA SER QL IEP: 180 MG/DL
IGG SER QL IEP: 1054 MG/DL
IGM SER QL IEP: 96 MG/DL
KAPPA LC CSF-MCNC: 1.37 MG/DL
KAPPA LC SERPL-MCNC: 1.84 MG/DL
LDLC SERPL CALC-MCNC: 93 MG/DL
MPO AB + PR3 PNL SER: NORMAL
NONHDLC SERPL-MCNC: 103 MG/DL
PARATHYROID HORMONE INTACT: 137 PG/ML
POTASSIUM SERPL-SCNC: 4.6 MMOL/L
PROT SERPL-MCNC: 7 G/DL
SODIUM SERPL-SCNC: 136 MMOL/L
TRIGL SERPL-MCNC: 54 MG/DL

## 2021-03-05 LAB — HIV1+2 AB SPEC QL IA.RAPID: NONREACTIVE

## 2021-03-08 LAB — ANA SER IF-ACNC: NEGATIVE

## 2021-03-18 ENCOUNTER — APPOINTMENT (OUTPATIENT)
Dept: RADIOLOGY | Facility: HOSPITAL | Age: 51
End: 2021-03-18
Payer: COMMERCIAL

## 2021-03-18 ENCOUNTER — OUTPATIENT (OUTPATIENT)
Dept: OUTPATIENT SERVICES | Facility: HOSPITAL | Age: 51
LOS: 1 days | End: 2021-03-18
Payer: COMMERCIAL

## 2021-03-18 ENCOUNTER — RESULT REVIEW (OUTPATIENT)
Age: 51
End: 2021-03-18

## 2021-03-18 PROCEDURE — 77085 DXA BONE DENSITY AXL VRT FX: CPT | Mod: 26

## 2021-03-18 PROCEDURE — 77085 DXA BONE DENSITY AXL VRT FX: CPT

## 2021-03-24 ENCOUNTER — APPOINTMENT (OUTPATIENT)
Dept: INTERNAL MEDICINE | Facility: CLINIC | Age: 51
End: 2021-03-24
Payer: COMMERCIAL

## 2021-03-24 VITALS
SYSTOLIC BLOOD PRESSURE: 135 MMHG | WEIGHT: 167 LBS | TEMPERATURE: 97.2 F | OXYGEN SATURATION: 99 % | BODY MASS INDEX: 26.21 KG/M2 | HEART RATE: 87 BPM | HEIGHT: 67 IN | DIASTOLIC BLOOD PRESSURE: 82 MMHG

## 2021-03-24 LAB — UREA BREATH TEST QL: POSITIVE

## 2021-03-24 PROCEDURE — 99214 OFFICE O/P EST MOD 30 MIN: CPT

## 2021-03-24 PROCEDURE — 99072 ADDL SUPL MATRL&STAF TM PHE: CPT

## 2021-03-25 ENCOUNTER — APPOINTMENT (OUTPATIENT)
Dept: MAMMOGRAPHY | Facility: HOSPITAL | Age: 51
End: 2021-03-25
Payer: COMMERCIAL

## 2021-03-25 ENCOUNTER — RESULT REVIEW (OUTPATIENT)
Age: 51
End: 2021-03-25

## 2021-03-25 ENCOUNTER — OUTPATIENT (OUTPATIENT)
Dept: OUTPATIENT SERVICES | Facility: HOSPITAL | Age: 51
LOS: 1 days | End: 2021-03-25
Payer: COMMERCIAL

## 2021-03-25 ENCOUNTER — APPOINTMENT (OUTPATIENT)
Dept: ULTRASOUND IMAGING | Facility: HOSPITAL | Age: 51
End: 2021-03-25
Payer: COMMERCIAL

## 2021-03-25 PROCEDURE — 77067 SCR MAMMO BI INCL CAD: CPT | Mod: 26

## 2021-03-25 PROCEDURE — 76641 ULTRASOUND BREAST COMPLETE: CPT | Mod: 26,50

## 2021-03-25 PROCEDURE — 76641 ULTRASOUND BREAST COMPLETE: CPT

## 2021-03-25 PROCEDURE — 77063 BREAST TOMOSYNTHESIS BI: CPT | Mod: 26

## 2021-03-25 PROCEDURE — 77067 SCR MAMMO BI INCL CAD: CPT

## 2021-03-25 PROCEDURE — 77063 BREAST TOMOSYNTHESIS BI: CPT

## 2022-05-17 ENCOUNTER — APPOINTMENT (OUTPATIENT)
Dept: ENDOCRINOLOGY | Facility: CLINIC | Age: 52
End: 2022-05-17
Payer: COMMERCIAL

## 2022-05-17 VITALS
HEIGHT: 67 IN | BODY MASS INDEX: 26.21 KG/M2 | DIASTOLIC BLOOD PRESSURE: 82 MMHG | WEIGHT: 167 LBS | HEART RATE: 71 BPM | SYSTOLIC BLOOD PRESSURE: 129 MMHG

## 2022-05-17 PROCEDURE — 99214 OFFICE O/P EST MOD 30 MIN: CPT

## 2022-05-17 RX ORDER — AMOXICILLIN 500 MG/1
500 CAPSULE ORAL TWICE DAILY
Qty: 56 | Refills: 0 | Status: DISCONTINUED | COMMUNITY
Start: 2021-03-26 | End: 2022-05-17

## 2022-05-17 RX ORDER — LANSOPRAZOLE 30 MG/1
30 CAPSULE, DELAYED RELEASE ORAL TWICE DAILY
Qty: 28 | Refills: 0 | Status: DISCONTINUED | COMMUNITY
Start: 2021-03-26 | End: 2022-05-17

## 2022-05-17 RX ORDER — LANSOPRAZOLE, AMOXICILLIN, CLARITHROMYCIN 30-500-500
KIT ORAL
Qty: 1 | Refills: 0 | Status: DISCONTINUED | COMMUNITY
Start: 2021-03-24 | End: 2022-05-17

## 2022-05-17 RX ORDER — CLARITHROMYCIN 500 MG/1
500 TABLET, FILM COATED ORAL
Qty: 28 | Refills: 0 | Status: DISCONTINUED | COMMUNITY
Start: 2021-03-26 | End: 2022-05-17

## 2022-05-17 NOTE — PHYSICAL EXAM
[Alert] : alert [Healthy Appearance] : healthy appearance [No Acute Distress] : no acute distress [No Proptosis] : no proptosis [No Lid Lag] : no lid lag [Normal Hearing] : hearing was normal [No LAD] : no lymphadenopathy [Thyroid Not Enlarged] : the thyroid was not enlarged [Clear to Auscultation] : lungs were clear to auscultation bilaterally [Normal S1, S2] : normal S1 and S2 [Regular Rhythm] : with a regular rhythm [No Stigmata of Cushings Syndrome] : no stigmata of Cushings Syndrome [Acanthosis Nigricans] : no acanthosis nigricans [Normal Affect] : the affect was normal [Normal Mood] : the mood was normal [de-identified] : no plethora, moon facies, dorsal or supraclavicular fat pads

## 2022-05-17 NOTE — REASON FOR VISIT
[Follow - Up] : a follow-up visit [Hypercalcemia] : hypercalcemia [Adrenal Evaluation/Adrenal Disorder] : adrenal evaluation/adrenal disorder [Hyperparathyroidism] : hyperparathyroidism

## 2022-05-17 NOTE — ASSESSMENT
[FreeTextEntry1] : Primary hyperpara, vitamin D deficiency\par continue vitamin D supplementation.  goal 25 D > 25 ng/ml\par agree with parathyroidectomy, if she is willing, since she is young and calcium increasing the past few years.  Recommended she see Dr Valenzuela to [facility] for surgery.\par \par Adrenal adenoma.\par check ACTH, DHEAS, cortisol to see if she has MACS (mid autonomous cortisol secretion)\par \par Borderline TSH in the past.  recheck today\par also will check annual labs -- A1c, lipids, Hb, ferritin\par RTO 1 year

## 2022-05-17 NOTE — DATA REVIEWED
[FreeTextEntry1] : 3/21: A1c 5.5%, tot chol 192, trig 54, HDL 89, LDL 93, Ca 10.8, , 25D 27.3\par 1/21: Ca 11.0, , 25D 27.9, 1,25D 94.2.  normal SPEP. lelo/renin 11.3/3.2, normal met/normet.  TPO 15.2, Tg Ab < 20, TSH 3.32\par 12/20: Ca 11.3, K 4.3, ferritin 13, iron 22, 25D 25.8, TSH 5.47\par 11/20: Ca 11.2, A1c 5.5%, K 4.2, tot protin 7.3\par \par bone density 3/21: no T scores reported?   normal Z scores\par 1/3 radius\par \par CT, PE protocol, 11/20:\par L adrenal adenoma, appearing benign (with fat). 1.5cm

## 2022-05-17 NOTE — HISTORY OF PRESENT ILLNESS
[FreeTextEntry1] : no health issues since last visit\par thinks she is in perimenopause -- periods are heavier and she gained some weight.\par no hot flashes.  never slept well after havng daughter (now 7 yo).\par though weight is the same in EMR as last year\par no polydipsia.  maybe some polyuria\par always tired and may be more irritable.  In the past, she did not want surgery but now is considering parathyroidectomy, if it will help her feel better\par She collected 24 hour urine last year, but no results can be found for it\par has R hip pain\par \par \par PMH:  primary hyperpara\par L adrenal adenoma 1.5cm\par \par No meds\par supplements:  vitamin D 1000 IU/day, turmeric, lipoic acid, fucoidan, piperine

## 2022-05-19 LAB
25(OH)D3 SERPL-MCNC: 25.5 NG/ML
ACTH SER-ACNC: 23.6 PG/ML
ALBUMIN SERPL ELPH-MCNC: 4.8 G/DL
ALP BLD-CCNC: 95 U/L
ALT SERPL-CCNC: 15 U/L
ANION GAP SERPL CALC-SCNC: 14 MMOL/L
AST SERPL-CCNC: 13 U/L
BILIRUB SERPL-MCNC: 0.4 MG/DL
BUN SERPL-MCNC: 17 MG/DL
CALCIUM SERPL-MCNC: 11.6 MG/DL
CHLORIDE SERPL-SCNC: 107 MMOL/L
CHOLEST SERPL-MCNC: 187 MG/DL
CO2 SERPL-SCNC: 21 MMOL/L
CORTIS SERPL-MCNC: 9.9 UG/DL
CREAT SERPL-MCNC: 0.76 MG/DL
DHEA-S SERPL-MCNC: 117 UG/DL
EGFR: 96 ML/MIN/1.73M2
ESTIMATED AVERAGE GLUCOSE: 108 MG/DL
FERRITIN SERPL-MCNC: 9 NG/ML
GLUCOSE SERPL-MCNC: 102 MG/DL
HBA1C MFR BLD HPLC: 5.4 %
HCT VFR BLD CALC: 37 %
HDLC SERPL-MCNC: 77 MG/DL
HGB BLD-MCNC: 11.2 G/DL
LDLC SERPL CALC-MCNC: 97 MG/DL
NONHDLC SERPL-MCNC: 110 MG/DL
POTASSIUM SERPL-SCNC: 5.3 MMOL/L
PROT SERPL-MCNC: 7.4 G/DL
SODIUM SERPL-SCNC: 142 MMOL/L
TRIGL SERPL-MCNC: 65 MG/DL
TSH SERPL-ACNC: 3.87 UIU/ML

## 2022-06-14 LAB
CAU: 28 MG/DL
CREAT 24H UR-MCNC: 1.6 G/24 H
CREAT ?TM UR-MCNC: 100 MG/DL
PROT ?TM UR-MCNC: 24 HR
SPECIMEN VOL 24H UR: 1625 ML
SPECIMEN VOL 24H UR: 455 MG/24 H

## 2022-06-15 ENCOUNTER — APPOINTMENT (OUTPATIENT)
Dept: OTOLARYNGOLOGY | Facility: CLINIC | Age: 52
End: 2022-06-15
Payer: COMMERCIAL

## 2022-06-15 VITALS
DIASTOLIC BLOOD PRESSURE: 79 MMHG | HEIGHT: 67 IN | TEMPERATURE: 98 F | HEART RATE: 63 BPM | BODY MASS INDEX: 26.53 KG/M2 | OXYGEN SATURATION: 99 % | WEIGHT: 169 LBS | RESPIRATION RATE: 18 BRPM | SYSTOLIC BLOOD PRESSURE: 127 MMHG

## 2022-06-15 DIAGNOSIS — Z86.39 PERSONAL HISTORY OF OTHER ENDOCRINE, NUTRITIONAL AND METABOLIC DISEASE: ICD-10-CM

## 2022-06-15 DIAGNOSIS — D44.0 NEOPLASM OF UNCERTAIN BEHAVIOR OF THYROID GLAND: ICD-10-CM

## 2022-06-15 PROCEDURE — 31575 DIAGNOSTIC LARYNGOSCOPY: CPT

## 2022-06-15 PROCEDURE — 10005 FNA BX W/US GDN 1ST LES: CPT

## 2022-06-15 PROCEDURE — 76536 US EXAM OF HEAD AND NECK: CPT

## 2022-06-15 PROCEDURE — 99205 OFFICE O/P NEW HI 60 MIN: CPT | Mod: 25

## 2022-06-15 NOTE — REASON FOR VISIT
[FreeTextEntry2] : a surgical consultation concerning hypercalcemia, hypercalciuria, and a possible parathyroid adenoma.  [FreeTextEntry1] : Referred by Zoe Monson MD Endocrine, PCP is Marquita Canada MD

## 2022-06-15 NOTE — HISTORY OF PRESENT ILLNESS
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  In March 2021 her calcium was 10.8 mg/dl, with iPTH of 137 pg/ml. Last month her calcium bumped up to 11.6 mg/dl. Her eGFR is normal at 95U/L.  A 24-hour urine collection for calcium was obtained this month and elevated to 455 mg/24h. Kary denies calcium supplements, HCTZ or past use of Lithium Carbonate. She had been on vitamin D3 1,000 IU daily but ran out recently. There is no family history of nephrolithiasis or renal disease.  There is no history of fragility bone fractures. Her DEXA scan was normal last year. Other than fatigue, memory loss, brain fog, right hip pain, she denies  muscle weakness, generalized bone aches, joint pain, depression,  nausea, vomiting, abdominal pain, constipation, polyuria/ polydipsia, nephrolithiasis, peptic ulcer disease or pancreatitis. She has a hx of mild GERD and H Pylori treated in the past.  She does have morning urinary incontinence. She was recently hypothyroid during her last pregnancy in 2013 and briefly put on thyroid hormone replacement but then recovered and is now euthyroid.  She denies recent shortness of breath, voice changes, dysphagia, anterior neck pain, neck pressure or mass. There is no family history of thyroid cancer. She has a known radiation exposure in her youth (age 16 from [place]). Vitamin D level is slightly low at 25.5 ng/mL in May.  She has not had any neck cross sectional imaging to date.  She is not aware of having a COVID-19 infection. She denies fever, body aches, cough, cyanosis, chest burning, anosmia or recent known COVID exposures.  All family members at home are well. She is fully vaccinated and boosted.

## 2022-06-15 NOTE — PROCEDURE
[Image(s) Captured] : image(s) captured and filed [Unable to Cooperate with Mirror] : patient unable to cooperate with mirror [Gag Reflex] : gag reflex preventing mirror examination [Topical Lidocaine] : topical lidocaine [Oxymetazoline HCl] : oxymetazoline HCl [Flexible Endoscope] : examined with the flexible endoscope [Serial Number: ___] : Serial Number: [unfilled] [FreeTextEntry3] : \par [facility]\par THYROID/NECK ULTRASOUND REPORT\par \par NAME: WHITNEY MORROW .Tracie...           MR# [id number].	              : 1970.....	         DATE: 6/15/2022.\par \par HISTORY/ INDICATIONS: A 51-year-old female with well-documented primary hyperparathyroidism for preoperative assessment to identify a parathyroid adenoma and/or thyroid pathology.\par \par COMPARISON: None.\par \par PROCEDURE: Physician performed high-resolution ultrasound gray scale imaging and color Doppler supplementation of the thyroid gland and neck was obtained in the longitudinal and transverse planes using a 13 MHz linear transducer with image capture.  All measurements are in centimeters (longitudinal x AP x transverse).  \par \par FINDINGS: Overall the thyroid gland is normal in size, heterogeneous in echotexture with normal vascularity on color Doppler flow. \par \par RIGHT LOBE: Is not enlarged, heterogeneous, with normal vascularity on color Doppler and measures 4.92 x 1.38 x 1.69 cm.  NODULES: Within the extreme lower pole there is a mildly hypoechoic heterogeneous nodule that is smoothly marginated, wider than tall without microcalcifications and grade 2 vascularity that measures 0.55 x 0.31 x 0.57 cm.  A second solid nodule is identified in the right lower lobe that is hypoechoic, heterogeneous, wider than tall, with grade 2 vascularity and several possible microcalcifications that measures 1.41 x 0.93 x 1.23 cm, TI-RADS 5.\par \par ISTHMUS: Measures 0.26 cm in AP dimension and is heterogeneous in echotexture with normal vascularity.  No nodules are identified.\par \par LEFT LOBE: Is not enlarged, heterogeneous, with normal vascularity on color Doppler and measures 4.31 x 1.19 x 1.61 cm. NODULES: None identified.\par \par PARATHYROID GLANDS: Inferior and slightly posterior to the right lower pole of the thyroid gland is a partially solid and partially cystic smoothly marginated nodule that is  by an echogenic line and is hypervascular with a large feeding polar vessel that measures 2.37 x 1.24 x 0.99 cm and highly suspicious for a cystic parathyroid adenoma.  There are no other identified enlarged parathyroid glands in the central neck compartment. \par \par LYMPH NODES: Bilateral neck levels I - VI were examined.  There are several benign appearing subcentimeter lymph nodes identified at neck levels II- III bilaterally (lateral neck), all with echogenic hilar lines, no calcifications or cystic degeneration and have a short long axis ratio < 0.5 in the transverse plane.  There are no enlarged or abnormal appearing central compartment, level VI lymph nodes.\par \par IMPRESSION: A 51-year-old female with well-documented primary hyperparathyroidism.  There are 2 nodules in the right thyroid lobe.  At the extreme lower pole there is a subcentimeter nodule that does not need further evaluation.  However a solid hypoechoic nodule with microcalcifications is present in the right lower lobe, TI-RADS 5.  Inferior to the right thyroid lobe is a complex cystic structure with hypervascularity that is consistent with a parathyroid adenoma measuring up to 2.37 cm.\par \par RECOMMENDATIONS: An ultrasound-guided FNA biopsy should be performed from the dominant right lobe nodule and a 4-D CT scan obtained to confirm the ultrasound suspicion of a right inferior cystic parathyroid adenoma.\par \par Electronically signed by referring, interpreting and reporting physician Elvis Valenzuela MD on 6/15/2022, 12:15 PM.\par \par [facility]\par [address], [address] [place] [address]\par [phone] (voice), [phone] (fax)\par \par \par \par ...................................[facility]\par ...........ULTRASOUND-GUIDED THYROID FINE NEEDLE ASPIRATION BIOPSY REPORT\par \par NAME: WHITNEY MORROW.           MR# [id number].	              : 1970..	         DATE: 6/15/2022 .........TIME: 12:27 PM.\par \par HISTORY/ INDICATIONS: A 51-year-old female with a dominant right solid hypoechoic thyroid nodule TI-RADS 5 for preoperative assessment prior to parathyroidectomy.\par \par EXAM: Real-time high-resolution ultrasound imaging of the thyroid gland was performed in the longitudinal and transverse planes with color power Doppler supplementation and image capture.\par \par FINDINGS: A right lower lobe nodule measuring 1.41 x 0.93 x 1.23 cm (longitudinal x AP x transverse) was identified and targeted for USG-FNA.  The nodule had the following ultrasonographic characteristics: solid, hypoechoic, heterogeneous, lobulated margins, multiple punctate echogenic foci, grade 2 vascularity on color Doppler, and wider-than-tall.\par \par PROCEDURE: A time out took place and documented for correct patient identifiers, site and side of procedure.  After obtaining informed consent with discussion of risks, benefits and alternatives, the patient was positioned semi-supine, the skin was prepped with alcohol and 0.5 cc of 1% Lidocaine with 1:100,000 epinephrine was injected for local anesthesia. A #25-gauge needle was then passed along the perpendicular plane of the transducer, positioned within the nodule and confirmed with ultrasonography.  Multiple aspirations were made within the nodule with two separate needle punctures, four passes each.  Aspirates were smeared on glass slides and also directly placed into both formalin and cytolyt solutions for cytologic analysis, immunohistochemistry, and possible molecular genomic diagnostics.  The patient tolerated the procedure well without complications and was discharged with signed post-procedure instructions indicating the importance of following up on all results. \par \par ASSESSMENT & PLAN: Successful USG-FNA of a right lower lobe solid hypoechoic nodule was well tolerated without complications. The patient will be contacted to review the cytologic findings as soon as available for further treatment planning.  A discussion took place with the patient who accepted the responsibility to call the office to review the cytology results if no communication occurs within two weeks. \par \par Electronically signed by referring, interpreting and reporting physician:\sarah VALENZUELA M.D., FACS on 6/15/2022, 12:40 PM.\par \par [facility] [place]: [address], [address],  [place] [address]\par [phone] (voice), [phone] (fax) [de-identified] : The nasal septum is minimally deviated to the right. There are no masses or polyps and the nasal mucosa and secretions are normal. The choanae and posterior nasopharynx are normal without masses or drainage. The Eustachian tube orifices appear patent. The pharynx, including the posterior and lateral pharyngeal walls, the vallecula and base of tongue are normal without ulcerations, lesions or masses. The hypopharynx including the pyriform sinuses open well without pooling of secretions, mucosal lesions or masses. The supraglottic larynx including the epiglottis, petiole, arytenoids, glossoepiglottic, aryepiglottic and pharyngoepiglottic folds are normal without mucosal lesions, ulcerations or masses. The glottis reveals normal false vocal folds. The true vocal folds are glistening white, tense and of equal length, without paralysis, having symmetric mobility on adduction and abduction. There are no mucosal lesions, nodules, cysts, erythroplasia or leukoplakia. The posterior cricoid area has healthy pink mucosa in the interarytenoid area and esophageal inlet. There is minimal thickening/pachydermia of the interarytenoid mucosa suggestive of posterior laryngitis from laryngopharyngeal acid reflux disease. The trachea is clear without narrowing in the immediate subglottic region, without deviation or lesions.  [de-identified] : preop evaluation parathyroidectomy

## 2022-06-15 NOTE — CONSULT LETTER
[Dear  ___] : Dear  [unfilled], [Consult Letter:] : I had the pleasure of evaluating your patient, [unfilled]. [Please see my note below.] : Please see my note below. [Consult Closing:] : Thank you very much for allowing me to participate in the care of this patient.  If you have any questions, please do not hesitate to contact me. [Sincerely,] : Sincerely, [DrTracie  ___] : Dr. ZAPIEN [FreeTextEntry3] : \par Elvis Valenzuela M.D., FACS, ECNU\par Director Center for Thyroid & Parathyroid Surgery at [facility]\par [facility]\par Certified in Thyroid/Parathyroid/Neck Ultrasound, ECNU/ AIUM\par , Department of Otolaryngology\par [employer] at [facility]\par

## 2022-06-29 ENCOUNTER — OUTPATIENT (OUTPATIENT)
Dept: OUTPATIENT SERVICES | Facility: HOSPITAL | Age: 52
LOS: 1 days | End: 2022-06-29
Payer: COMMERCIAL

## 2022-06-29 ENCOUNTER — NON-APPOINTMENT (OUTPATIENT)
Age: 52
End: 2022-06-29

## 2022-06-29 ENCOUNTER — APPOINTMENT (OUTPATIENT)
Dept: INTERNAL MEDICINE | Facility: CLINIC | Age: 52
End: 2022-06-29
Payer: COMMERCIAL

## 2022-06-29 VITALS
SYSTOLIC BLOOD PRESSURE: 131 MMHG | HEIGHT: 65 IN | OXYGEN SATURATION: 98 % | HEART RATE: 77 BPM | BODY MASS INDEX: 27.99 KG/M2 | DIASTOLIC BLOOD PRESSURE: 83 MMHG | TEMPERATURE: 98.5 F | WEIGHT: 168 LBS

## 2022-06-29 DIAGNOSIS — D22.9 MELANOCYTIC NEVI, UNSPECIFIED: ICD-10-CM

## 2022-06-29 DIAGNOSIS — D44.0 NEOPLASM OF UNCERTAIN BEHAVIOR OF THYROID GLAND: ICD-10-CM

## 2022-06-29 DIAGNOSIS — R92.2 INCONCLUSIVE MAMMOGRAM: ICD-10-CM

## 2022-06-29 DIAGNOSIS — Z01.818 ENCOUNTER FOR OTHER PREPROCEDURAL EXAMINATION: ICD-10-CM

## 2022-06-29 DIAGNOSIS — Z11.3 ENCOUNTER FOR SCREENING FOR INFECTIONS WITH A PREDOMINANTLY SEXUAL MODE OF TRANSMISSION: ICD-10-CM

## 2022-06-29 PROCEDURE — 99215 OFFICE O/P EST HI 40 MIN: CPT | Mod: 25

## 2022-06-29 PROCEDURE — 36415 COLL VENOUS BLD VENIPUNCTURE: CPT

## 2022-06-29 PROCEDURE — 93000 ELECTROCARDIOGRAM COMPLETE: CPT

## 2022-06-30 ENCOUNTER — RESULT REVIEW (OUTPATIENT)
Age: 52
End: 2022-06-30

## 2022-06-30 LAB
25(OH)D3 SERPL-MCNC: 26.5 NG/ML
ALBUMIN SERPL ELPH-MCNC: 4.5 G/DL
ALP BLD-CCNC: 86 U/L
ALT SERPL-CCNC: 15 U/L
ANION GAP SERPL CALC-SCNC: 12 MMOL/L
APPEARANCE: CLEAR
APTT BLD: 29.4 SEC
AST SERPL-CCNC: 16 U/L
BACTERIA: NEGATIVE
BASOPHILS # BLD AUTO: 0.05 K/UL
BASOPHILS NFR BLD AUTO: 0.9 %
BILIRUB SERPL-MCNC: 0.2 MG/DL
BILIRUBIN URINE: NEGATIVE
BLOOD URINE: NEGATIVE
BUN SERPL-MCNC: 14 MG/DL
CALCIUM SERPL-MCNC: 10 MG/DL
CALCIUM SERPL-MCNC: 9.8 MG/DL
CHLORIDE SERPL-SCNC: 110 MMOL/L
CO2 SERPL-SCNC: 20 MMOL/L
COLOR: NORMAL
CREAT SERPL-MCNC: 0.73 MG/DL
EGFR: 101 ML/MIN/1.73M2
EOSINOPHIL # BLD AUTO: 0.22 K/UL
EOSINOPHIL NFR BLD AUTO: 4.2 %
GLUCOSE QUALITATIVE U: NEGATIVE
GLUCOSE SERPL-MCNC: 100 MG/DL
HCG SERPL-MCNC: <1 MIU/ML
HCT VFR BLD CALC: 34.8 %
HCV AB SER QL: NONREACTIVE
HCV S/CO RATIO: 0.09 S/CO
HGB BLD-MCNC: 10.4 G/DL
HIV1+2 AB SPEC QL IA.RAPID: NONREACTIVE
HYALINE CASTS: 1 /LPF
IMM GRANULOCYTES NFR BLD AUTO: 0.2 %
INR PPP: 1.04 RATIO
KETONES URINE: NEGATIVE
LEUKOCYTE ESTERASE URINE: NEGATIVE
LYMPHOCYTES # BLD AUTO: 1.13 K/UL
LYMPHOCYTES NFR BLD AUTO: 21.4 %
MAN DIFF?: NORMAL
MCHC RBC-ENTMCNC: 26.4 PG
MCHC RBC-ENTMCNC: 29.9 GM/DL
MCV RBC AUTO: 88.3 FL
MICROSCOPIC-UA: NORMAL
MONOCYTES # BLD AUTO: 0.33 K/UL
MONOCYTES NFR BLD AUTO: 6.2 %
NEUTROPHILS # BLD AUTO: 3.55 K/UL
NEUTROPHILS NFR BLD AUTO: 67.1 %
NITRITE URINE: NEGATIVE
NON-GYNECOLOGICAL CYTOLOGY STUDY: SIGNIFICANT CHANGE UP
PARATHYROID HORMONE INTACT: 87 PG/ML
PH URINE: 5.5
PLATELET # BLD AUTO: 197 K/UL
POTASSIUM SERPL-SCNC: 4.5 MMOL/L
PROT SERPL-MCNC: 6.8 G/DL
PROTEIN URINE: NORMAL
PT BLD: 12.2 SEC
RBC # BLD: 3.94 M/UL
RBC # FLD: 14.3 %
RED BLOOD CELLS URINE: 6 /HPF
SODIUM SERPL-SCNC: 142 MMOL/L
SPECIFIC GRAVITY URINE: 1.02
SQUAMOUS EPITHELIAL CELLS: 5 /HPF
T3FREE SERPL-MCNC: 2.73 PG/ML
T4 FREE SERPL-MCNC: 1.1 NG/DL
THYROPEROXIDASE AB SERPL IA-ACNC: <10 IU/ML
TSH SERPL-ACNC: 3.77 UIU/ML
UROBILINOGEN URINE: NORMAL
WBC # FLD AUTO: 5.29 K/UL
WHITE BLOOD CELLS URINE: 3 /HPF

## 2022-06-30 PROCEDURE — 88321 CONSLTJ&REPRT SLD PREP ELSWR: CPT

## 2022-06-30 NOTE — PLAN
[FreeTextEntry1] : \par \par CLEARED FOR THYROIDECTOMY/ PARATHYROIDECTOMY\par \par SHE WILL OBTAIN SCREENING MAMMOGRAM AND SEE DERMATOLOGY FOR ROUTINE SCREEN IN SUMMER AS WELL; REFERRALS PROVIDED.\par \par F/U IN 3 MONTHS IN OFFICE.

## 2022-06-30 NOTE — ADDENDUM
[FreeTextEntry1] : \par CHEST XRAY FROM 6/23/2022 unremarkable, and labs from 629/2020 reviewed and include unremarkable CBC (HGB 10, but was 11 5/19/2022), CMP, PT, PTT, neg HCG, normal TFTs.\par \par CLEARED FOR PLANNED OPERATIONS.

## 2022-06-30 NOTE — HISTORY OF PRESENT ILLNESS
[(Patient denies any chest pain, claudication, dyspnea on exertion, orthopnea, palpitations or syncope)] : Patient denies any chest pain, claudication, dyspnea on exertion, orthopnea, palpitations or syncope [Good (7-10 METs)] : Good (7-10 METs) [No Pertinent Cardiac History] : no history of aortic stenosis, atrial fibrillation, coronary artery disease, recent myocardial infarction, or implantable device/pacemaker [No Pertinent Pulmonary History] : no history of asthma, COPD, sleep apnea, or smoking [No Adverse Anesthesia Reaction] : no adverse anesthesia reaction in self or family member [Family Member] : no family member with adverse anesthesia reaction/sudden death [Self] : no previous adverse anesthesia reaction [Chronic Anticoagulation] : no chronic anticoagulation [Chronic Kidney Disease] : no chronic kidney disease [Diabetes] : no diabetes [FreeTextEntry1] : PARATHYROIDECTOMY AND THRYOIDECTOMY [FreeTextEntry2] : 7/8/2022 [FreeTextEntry3] : NADINE PALMA MD [FreeTextEntry4] : 51 YR OLD WITH PARATHYROID MASS; RECENT BX POS FOR PAPILLARY CA THYROID HERE FOR PREOP.  Had been visiting relatives in [facility] at time of [place] accident. Active, unlimited by CP symptoms. Takes no meds.  Has not had anesthesia before. Feels well.  COVID vaccinated and boosted. Has declined MMGs, only agreed to breast US in the past.  Reviewed with her, and she will arrange for MMG this summer; clinical breast exam normal and she is up to date with gyn care. Notes irregular mole on left nipple and will see Dermatology as well later this summer.

## 2022-06-30 NOTE — ASSESSMENT
[Patient Optimized for Surgery] : Patient optimized for surgery [As per surgery] : as per surgery [FreeTextEntry4] : At no increased risk for perioperative cardiac complications\par Hold supplements until post op. \par CLEARED PENDING LABS AND CXR.\par \par

## 2022-06-30 NOTE — PHYSICAL EXAM
[No Acute Distress] : no acute distress [Well Nourished] : well nourished [Well Developed] : well developed [Well-Appearing] : well-appearing [Normal Sclera/Conjunctiva] : normal sclera/conjunctiva [PERRL] : pupils equal round and reactive to light [EOMI] : extraocular movements intact [Normal Outer Ear/Nose] : the outer ears and nose were normal in appearance [Normal Oropharynx] : the oropharynx was normal [No JVD] : no jugular venous distention [No Lymphadenopathy] : no lymphadenopathy [Supple] : supple [No Respiratory Distress] : no respiratory distress  [No Accessory Muscle Use] : no accessory muscle use [Clear to Auscultation] : lungs were clear to auscultation bilaterally [Normal Rate] : normal rate  [Regular Rhythm] : with a regular rhythm [Normal S1, S2] : normal S1 and S2 [No Murmur] : no murmur heard [No Carotid Bruits] : no carotid bruits [No Abdominal Bruit] : a ~M bruit was not heard ~T in the abdomen [No Varicosities] : no varicosities [No Edema] : there was no peripheral edema [Pedal Pulses Present] : the pedal pulses are present [No Palpable Aorta] : no palpable aorta [No Extremity Clubbing/Cyanosis] : no extremity clubbing/cyanosis [Soft] : abdomen soft [Non Tender] : non-tender [Non-distended] : non-distended [No Masses] : no abdominal mass palpated [No HSM] : no HSM [Normal Bowel Sounds] : normal bowel sounds [Normal Posterior Cervical Nodes] : no posterior cervical lymphadenopathy [Normal Anterior Cervical Nodes] : no anterior cervical lymphadenopathy [No CVA Tenderness] : no CVA  tenderness [No Spinal Tenderness] : no spinal tenderness [No Joint Swelling] : no joint swelling [Grossly Normal Strength/Tone] : grossly normal strength/tone [No Rash] : no rash [Coordination Grossly Intact] : coordination grossly intact [No Focal Deficits] : no focal deficits [Normal Gait] : normal gait [Deep Tendon Reflexes (DTR)] : deep tendon reflexes were 2+ and symmetric [Normal Affect] : the affect was normal [Normal Insight/Judgement] : insight and judgment were intact [de-identified] : 3 cm palpable mass right thyroid no cervical LAD

## 2022-07-05 ENCOUNTER — APPOINTMENT (OUTPATIENT)
Dept: OTOLARYNGOLOGY | Facility: CLINIC | Age: 52
End: 2022-07-05

## 2022-07-05 VITALS
OXYGEN SATURATION: 98 % | TEMPERATURE: 98.1 F | DIASTOLIC BLOOD PRESSURE: 86 MMHG | SYSTOLIC BLOOD PRESSURE: 143 MMHG | HEIGHT: 65 IN | BODY MASS INDEX: 27.92 KG/M2 | RESPIRATION RATE: 18 BRPM | WEIGHT: 167.55 LBS | HEART RATE: 63 BPM

## 2022-07-05 DIAGNOSIS — J35.1 HYPERTROPHY OF TONSILS: ICD-10-CM

## 2022-07-05 PROCEDURE — 99215 OFFICE O/P EST HI 40 MIN: CPT | Mod: 25

## 2022-07-05 PROCEDURE — 31575 DIAGNOSTIC LARYNGOSCOPY: CPT

## 2022-07-05 NOTE — DATA REVIEWED
[de-identified] : see HPI  [de-identified] : see HPI  [de-identified] : Endocrine notes reviewed.  Cytopathology reports and molecular genomic studies reviewed

## 2022-07-05 NOTE — HISTORY OF PRESENT ILLNESS
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  In March 2021 her calcium was 10.8 mg/dl, with iPTH of 137 pg/ml. Last month her calcium bumped up to 11.6 mg/dl. Her eGFR is normal at 95U/L.  A 24-hour urine collection for calcium was obtained this month and elevated to 455 mg/24h. Kary denies calcium supplements, HCTZ or past use of Lithium Carbonate. She had been on vitamin D3 1,000 IU daily but ran out recently. There is no family history of nephrolithiasis or renal disease.  There is no history of fragility bone fractures. Her DEXA scan was normal last year. Other than fatigue, memory loss, brain fog, right hip pain, she denies  muscle weakness, generalized bone aches, joint pain, depression,  nausea, vomiting, abdominal pain, constipation, polyuria/ polydipsia, nephrolithiasis, peptic ulcer disease or pancreatitis. She has a hx of mild GERD and H Pylori treated in the past.  She does have morning urinary incontinence. She was recently hypothyroid during her last pregnancy in 2013 and briefly put on thyroid hormone replacement but then recovered and is now euthyroid.  She denies recent shortness of breath, voice changes, dysphagia, anterior neck pain, neck pressure or mass. There is no family history of thyroid cancer. She has a known radiation exposure in her youth (age 16 from [place]). Vitamin D level is slightly low at 25.5 ng/mL in May.  She has not had any neck cross sectional imaging to date.  She is not aware of having a COVID-19 infection. She denies fever, body aches, cough, cyanosis, chest burning, anosmia or recent known COVID exposures.  All family members at home are well. She is fully vaccinated and boosted.

## 2022-07-05 NOTE — PROCEDURE
[Image(s) Captured] : image(s) captured and filed [Unable to Cooperate with Mirror] : patient unable to cooperate with mirror [Gag Reflex] : gag reflex preventing mirror examination [Topical Lidocaine] : topical lidocaine [Oxymetazoline HCl] : oxymetazoline HCl [Flexible Endoscope] : examined with the flexible endoscope [Serial Number: ___] : Serial Number: [unfilled] [de-identified] : The nasal septum is minimally deviated to the right. There are no masses or polyps and the nasal mucosa and secretions are normal. The choanae and posterior nasopharynx are normal without masses or drainage. The Eustachian tube orifices appear patent. The pharynx, including the posterior and lateral pharyngeal walls, the vallecula and base of tongue are normal without ulcerations, lesions or masses.  The lingual tonsils are hyperplastic with slight asymmetry right greater than left without discrete mass noted.  The hypopharynx including the pyriform sinuses open well without pooling of secretions, mucosal lesions or masses. The supraglottic larynx including the epiglottis, petiole, arytenoids, glossoepiglottic, aryepiglottic and pharyngoepiglottic folds are normal without mucosal lesions, ulcerations or masses. The glottis reveals normal false vocal folds. The true vocal folds are glistening white, tense and of equal length, without paralysis, having symmetric mobility on adduction and abduction. There are no mucosal lesions, nodules, cysts, erythroplasia or leukoplakia. The posterior cricoid area has healthy pink mucosa in the interarytenoid area and esophageal inlet. There is minimal thickening/pachydermia of the interarytenoid mucosa suggestive of posterior laryngitis from laryngopharyngeal acid reflux disease. The trachea is clear without narrowing in the immediate subglottic region, without deviation or lesions.  [de-identified] : preop evaluation parathyroidectomy and total thyroidectomy

## 2022-07-05 NOTE — CONSULT LETTER
[Dear  ___] : Dear  [unfilled], [Consult Letter:] : I had the pleasure of evaluating your patient, [unfilled]. [Please see my note below.] : Please see my note below. [Sincerely,] : Sincerely, [Consult Closing:] : Thank you very much for allowing me to participate in the care of this patient.  If you have any questions, please do not hesitate to contact me. [DrTracie  ___] : Dr. ZAPIEN [FreeTextEntry3] : \par Elvis Valenzuela M.D., FACS, ECNU\par Director Center for [facility]roid & Parathyroid Surgery at [facility]\par [facility]\par Certified in Thyroid/Parathyroid/Neck Ultrasound, ECNU/ AIUM\par , Department of Otolaryngology\par [facility] [employer] at [facility]\par

## 2022-07-05 NOTE — REASON FOR VISIT
[FreeTextEntry2] : a f/u surgical consultation concerning hypercalcemia, hypercalciuria, and a parathyroid adenoma and now newly diagnosed papillary thyroid carcinoma. [FreeTextEntry1] : Referred by Zoe Monson MD Endocrine, PCP is Marquita Canada MD

## 2022-07-07 ENCOUNTER — TRANSCRIPTION ENCOUNTER (OUTPATIENT)
Age: 52
End: 2022-07-07

## 2022-07-07 VITALS
RESPIRATION RATE: 16 BRPM | TEMPERATURE: 99 F | SYSTOLIC BLOOD PRESSURE: 126 MMHG | DIASTOLIC BLOOD PRESSURE: 79 MMHG | HEIGHT: 67 IN | OXYGEN SATURATION: 98 % | WEIGHT: 166.01 LBS | HEART RATE: 88 BPM

## 2022-07-07 LAB — SARS-COV-2 N GENE NPH QL NAA+PROBE: NOT DETECTED

## 2022-07-07 NOTE — ASU PATIENT PROFILE, ADULT - NSICDXPASTSURGICALHX_GEN_ALL_CORE_FT
PAST SURGICAL HISTORY:  H/O:      History of tonsillectomy      PAST SURGICAL HISTORY:  H/O:      History of nasal surgery     History of tonsillectomy      PAST SURGICAL HISTORY:  H/O:      History of nasal surgery For Allergic Rhinitis - 30 years ago    History of tonsillectomy

## 2022-07-07 NOTE — ASU PATIENT PROFILE, ADULT - FALL HARM RISK - HARM RISK INTERVENTIONS

## 2022-07-07 NOTE — ASU PATIENT PROFILE, ADULT - NSICDXPASTMEDICALHX_GEN_ALL_CORE_FT
PAST MEDICAL HISTORY:  Allergic rhinitis     Vitamin D deficiency      PAST MEDICAL HISTORY:  Allergic rhinitis     Seasonal allergies     Vitamin D deficiency

## 2022-07-08 ENCOUNTER — APPOINTMENT (OUTPATIENT)
Dept: OTOLARYNGOLOGY | Facility: HOSPITAL | Age: 52
End: 2022-07-08

## 2022-07-08 ENCOUNTER — RESULT REVIEW (OUTPATIENT)
Age: 52
End: 2022-07-08

## 2022-07-08 ENCOUNTER — OUTPATIENT (OUTPATIENT)
Dept: INPATIENT UNIT | Facility: HOSPITAL | Age: 52
LOS: 1 days | Discharge: HOME | End: 2022-07-08
Payer: COMMERCIAL

## 2022-07-08 ENCOUNTER — TRANSCRIPTION ENCOUNTER (OUTPATIENT)
Age: 52
End: 2022-07-08

## 2022-07-08 DIAGNOSIS — Z98.891 HISTORY OF UTERINE SCAR FROM PREVIOUS SURGERY: Chronic | ICD-10-CM

## 2022-07-08 DIAGNOSIS — Z90.89 ACQUIRED ABSENCE OF OTHER ORGANS: Chronic | ICD-10-CM

## 2022-07-08 DIAGNOSIS — Z98.890 OTHER SPECIFIED POSTPROCEDURAL STATES: Chronic | ICD-10-CM

## 2022-07-08 LAB
ALBUMIN SERPL ELPH-MCNC: 4.6 G/DL — SIGNIFICANT CHANGE UP (ref 3.3–5)
ALP SERPL-CCNC: 92 U/L — SIGNIFICANT CHANGE UP (ref 40–120)
ALT FLD-CCNC: 15 U/L — SIGNIFICANT CHANGE UP (ref 10–45)
ANION GAP SERPL CALC-SCNC: 11 MMOL/L — SIGNIFICANT CHANGE UP (ref 5–17)
AST SERPL-CCNC: 20 U/L — SIGNIFICANT CHANGE UP (ref 10–40)
BILIRUB SERPL-MCNC: 0.3 MG/DL — SIGNIFICANT CHANGE UP (ref 0.2–1.2)
BUN SERPL-MCNC: 12 MG/DL — SIGNIFICANT CHANGE UP (ref 7–23)
CALCIUM SERPL-MCNC: 9.1 MG/DL — SIGNIFICANT CHANGE UP (ref 8.4–10.5)
CHLORIDE SERPL-SCNC: 104 MMOL/L — SIGNIFICANT CHANGE UP (ref 96–108)
CO2 SERPL-SCNC: 23 MMOL/L — SIGNIFICANT CHANGE UP (ref 22–31)
CREAT SERPL-MCNC: 0.7 MG/DL — SIGNIFICANT CHANGE UP (ref 0.5–1.3)
EGFR: 106 ML/MIN/1.73M2 — SIGNIFICANT CHANGE UP
GLUCOSE SERPL-MCNC: 143 MG/DL — HIGH (ref 70–99)
HCT VFR BLD CALC: 34.5 % — SIGNIFICANT CHANGE UP (ref 34.5–45)
HGB BLD-MCNC: 11.2 G/DL — LOW (ref 11.5–15.5)
MAGNESIUM SERPL-MCNC: 1.8 MG/DL — SIGNIFICANT CHANGE UP (ref 1.6–2.6)
MCHC RBC-ENTMCNC: 27.1 PG — SIGNIFICANT CHANGE UP (ref 27–34)
MCHC RBC-ENTMCNC: 32.5 GM/DL — SIGNIFICANT CHANGE UP (ref 32–36)
MCV RBC AUTO: 83.3 FL — SIGNIFICANT CHANGE UP (ref 80–100)
NRBC # BLD: 0 /100 WBCS — SIGNIFICANT CHANGE UP (ref 0–0)
PHOSPHATE SERPL-MCNC: 2.6 MG/DL — SIGNIFICANT CHANGE UP (ref 2.5–4.5)
PLATELET # BLD AUTO: 193 K/UL — SIGNIFICANT CHANGE UP (ref 150–400)
POTASSIUM SERPL-MCNC: 4.3 MMOL/L — SIGNIFICANT CHANGE UP (ref 3.5–5.3)
POTASSIUM SERPL-SCNC: 4.3 MMOL/L — SIGNIFICANT CHANGE UP (ref 3.5–5.3)
PROT SERPL-MCNC: 7.1 G/DL — SIGNIFICANT CHANGE UP (ref 6–8.3)
PTH-INTACT IO % DIF SERPL: 14.9 PG/ML — LOW (ref 15–65)
PTH-INTACT IO % DIF SERPL: 20.4 PG/ML — SIGNIFICANT CHANGE UP (ref 15–65)
PTH-INTACT IO % DIF SERPL: 32.5 PG/ML — SIGNIFICANT CHANGE UP (ref 15–65)
PTH-INTACT IO % DIF SERPL: 8.3 PG/ML — LOW (ref 15–65)
PTH-INTACT IO % DIF SERPL: 91.5 PG/ML — HIGH (ref 15–65)
RBC # BLD: 4.14 M/UL — SIGNIFICANT CHANGE UP (ref 3.8–5.2)
RBC # FLD: 14.2 % — SIGNIFICANT CHANGE UP (ref 10.3–14.5)
SODIUM SERPL-SCNC: 138 MMOL/L — SIGNIFICANT CHANGE UP (ref 135–145)
WBC # BLD: 14.55 K/UL — HIGH (ref 3.8–10.5)
WBC # FLD AUTO: 14.55 K/UL — HIGH (ref 3.8–10.5)

## 2022-07-08 PROCEDURE — 60520 REMOVAL OF THYMUS GLAND: CPT | Mod: GC

## 2022-07-08 PROCEDURE — 88331 PATH CONSLTJ SURG 1 BLK 1SPC: CPT | Mod: 26

## 2022-07-08 PROCEDURE — 88332 PATH CONSLTJ SURG EA ADD BLK: CPT | Mod: 26

## 2022-07-08 PROCEDURE — 60252 REMOVAL OF THYROID: CPT | Mod: GC

## 2022-07-08 PROCEDURE — 60500 EXPLORE PARATHYROID GLANDS: CPT | Mod: GC,59

## 2022-07-08 PROCEDURE — 88307 TISSUE EXAM BY PATHOLOGIST: CPT | Mod: 26

## 2022-07-08 PROCEDURE — 88305 TISSUE EXAM BY PATHOLOGIST: CPT | Mod: 26

## 2022-07-08 PROCEDURE — 41105 BIOPSY OF TONGUE: CPT | Mod: GC

## 2022-07-08 PROCEDURE — 88304 TISSUE EXAM BY PATHOLOGIST: CPT | Mod: 26

## 2022-07-08 DEVICE — CLIP APPLIER ETHICON LIGACLIP 9 3/8" SMALL: Type: IMPLANTABLE DEVICE | Site: BILATERAL | Status: FUNCTIONAL

## 2022-07-08 RX ORDER — FERROUS SULFATE 325(65) MG
0 TABLET ORAL
Qty: 0 | Refills: 0 | DISCHARGE

## 2022-07-08 RX ORDER — ONDANSETRON 8 MG/1
4 TABLET, FILM COATED ORAL ONCE
Refills: 0 | Status: COMPLETED | OUTPATIENT
Start: 2022-07-08 | End: 2022-07-09

## 2022-07-08 RX ORDER — OXYCODONE HYDROCHLORIDE 5 MG/1
5 TABLET ORAL EVERY 6 HOURS
Refills: 0 | Status: DISCONTINUED | OUTPATIENT
Start: 2022-07-08 | End: 2022-07-08

## 2022-07-08 RX ORDER — CHOLECALCIFEROL (VITAMIN D3) 125 MCG
1 CAPSULE ORAL
Qty: 0 | Refills: 0 | DISCHARGE

## 2022-07-08 RX ORDER — SODIUM CHLORIDE 9 MG/ML
1000 INJECTION, SOLUTION INTRAVENOUS
Refills: 0 | Status: DISCONTINUED | OUTPATIENT
Start: 2022-07-08 | End: 2022-07-09

## 2022-07-08 RX ORDER — HYDROMORPHONE HYDROCHLORIDE 2 MG/ML
0.5 INJECTION INTRAMUSCULAR; INTRAVENOUS; SUBCUTANEOUS ONCE
Refills: 0 | Status: DISCONTINUED | OUTPATIENT
Start: 2022-07-08 | End: 2022-07-09

## 2022-07-08 RX ORDER — CALCITRIOL 0.5 UG/1
0.5 CAPSULE ORAL ONCE
Refills: 0 | Status: DISCONTINUED | OUTPATIENT
Start: 2022-07-08 | End: 2022-07-09

## 2022-07-08 RX ORDER — ACETAMINOPHEN 500 MG
650 TABLET ORAL ONCE
Refills: 0 | Status: DISCONTINUED | OUTPATIENT
Start: 2022-07-08 | End: 2022-07-09

## 2022-07-08 RX ORDER — DEXAMETHASONE 0.5 MG/5ML
5 ELIXIR ORAL EVERY 8 HOURS
Refills: 0 | Status: COMPLETED | OUTPATIENT
Start: 2022-07-08 | End: 2022-07-09

## 2022-07-08 RX ORDER — HEPARIN SODIUM 5000 [USP'U]/ML
5000 INJECTION INTRAVENOUS; SUBCUTANEOUS ONCE
Refills: 0 | Status: COMPLETED | OUTPATIENT
Start: 2022-07-09 | End: 2022-07-09

## 2022-07-08 RX ORDER — OXYCODONE HYDROCHLORIDE 5 MG/1
2.5 TABLET ORAL EVERY 6 HOURS
Refills: 0 | Status: DISCONTINUED | OUTPATIENT
Start: 2022-07-08 | End: 2022-07-08

## 2022-07-08 RX ADMIN — Medication 5 MILLIGRAM(S): at 22:52

## 2022-07-08 RX ADMIN — SODIUM CHLORIDE 115 MILLILITER(S): 9 INJECTION, SOLUTION INTRAVENOUS at 22:52

## 2022-07-08 NOTE — PROGRESS NOTE ADULT - ASSESSMENT
52yo F PMx GERD, PSH psc, tonsillectomy, w confirmed papillary thyroid ca and primary hyperparathyroidism, now s/p Total thyroidectomy w RLL paraythyroidectomy, central neck dissection, w LNM, base of tongue bx x3. (7/8)     23hr obs   Soft diet/IVF   IVHL when tolerating   Pain + nausea control   POC 7pm/Labs 9pm   Rocaltrol .5mg x1   Decadron q8hr x3   NEREYDA drain x1   AM labs   DC in AM

## 2022-07-08 NOTE — BRIEF OPERATIVE NOTE - NSICDXBRIEFPROCEDURE_GEN_ALL_CORE_FT
PROCEDURES:  Complete thyroidectomy 08-Jul-2022 17:14:37 w central neck dissection Hari Bajwa  Parathyroidectomy 08-Jul-2022 17:15:17  Hari Bajwa  Tongue biopsy, posterior one-third 08-Jul-2022 17:15:42  Hari Bajwa

## 2022-07-08 NOTE — BRIEF OPERATIVE NOTE - NSICDXBRIEFPREOP_GEN_ALL_CORE_FT
PRE-OP DIAGNOSIS:  Papillary thyroid carcinoma 08-Jul-2022 17:16:56  Hari Bajwa  Primary hyperparathyroidism 08-Jul-2022 17:17:08  Hari Bajwa

## 2022-07-08 NOTE — BRIEF OPERATIVE NOTE - OPERATION/FINDINGS
6cm transverse incision in neck crease, creation of superior + inferior platysmal flaps w electrocautery. Cervical linea alba incised w retraction and preservation of b/l strap muscles. Combination of sharp + blunt dissection & electrocautery down to R lobe, superior pole of thyroid gland, dense fibrotic tissue encountered requiring extensive lysis of adhesions. R superior pole freed w preservation of Superior Thyroid Artery main, and R RLN, which was noted to be non-recurrent. Ligation of Middle Thyroid Artery/Vein. R inferior pole released in similar fashion w densely adherent R parathyroid gland, suspicious for adenoma. R inferior thyroid artery main preserved, Isthmus/Jones's ligament released v electrocautery. L Lobes dissected free in similar fashion, w Hemostasis achieved. Strap muscles reapproximated w running 5-0 vicryl, platysmal layer closed w interrupted 5-0 Vicryl. Skin closed w running 5-0 prolene. BL PTH 91.5, end of case PTH 14.9.     Base of tongue bx x3 at end of case.

## 2022-07-08 NOTE — BRIEF OPERATIVE NOTE - NSICDXBRIEFPOSTOP_GEN_ALL_CORE_FT
POST-OP DIAGNOSIS:  Papillary thyroid carcinoma 08-Jul-2022 17:17:26  Hari Bajwa  Primary hyperparathyroidism 08-Jul-2022 17:17:56  Hari Bajwa   Pt will ambulate x 150 feet without a device independently in 1 week

## 2022-07-09 ENCOUNTER — TRANSCRIPTION ENCOUNTER (OUTPATIENT)
Age: 52
End: 2022-07-09

## 2022-07-09 VITALS
OXYGEN SATURATION: 98 % | DIASTOLIC BLOOD PRESSURE: 83 MMHG | TEMPERATURE: 98 F | HEART RATE: 80 BPM | RESPIRATION RATE: 16 BRPM | SYSTOLIC BLOOD PRESSURE: 132 MMHG

## 2022-07-09 LAB
ALBUMIN SERPL ELPH-MCNC: 4 G/DL — SIGNIFICANT CHANGE UP (ref 3.3–5)
ALBUMIN SERPL ELPH-MCNC: 4.1 G/DL — SIGNIFICANT CHANGE UP (ref 3.3–5)
ALP SERPL-CCNC: 82 U/L — SIGNIFICANT CHANGE UP (ref 40–120)
ALP SERPL-CCNC: 84 U/L — SIGNIFICANT CHANGE UP (ref 40–120)
ALT FLD-CCNC: 12 U/L — SIGNIFICANT CHANGE UP (ref 10–45)
ALT FLD-CCNC: 13 U/L — SIGNIFICANT CHANGE UP (ref 10–45)
ANION GAP SERPL CALC-SCNC: 11 MMOL/L — SIGNIFICANT CHANGE UP (ref 5–17)
ANION GAP SERPL CALC-SCNC: 15 MMOL/L — SIGNIFICANT CHANGE UP (ref 5–17)
AST SERPL-CCNC: 13 U/L — SIGNIFICANT CHANGE UP (ref 10–40)
AST SERPL-CCNC: 16 U/L — SIGNIFICANT CHANGE UP (ref 10–40)
BILIRUB SERPL-MCNC: 0.4 MG/DL — SIGNIFICANT CHANGE UP (ref 0.2–1.2)
BILIRUB SERPL-MCNC: 0.4 MG/DL — SIGNIFICANT CHANGE UP (ref 0.2–1.2)
BUN SERPL-MCNC: 11 MG/DL — SIGNIFICANT CHANGE UP (ref 7–23)
BUN SERPL-MCNC: 12 MG/DL — SIGNIFICANT CHANGE UP (ref 7–23)
CALCIUM SERPL-MCNC: 8.6 MG/DL — SIGNIFICANT CHANGE UP (ref 8.4–10.5)
CALCIUM SERPL-MCNC: 9.3 MG/DL — SIGNIFICANT CHANGE UP (ref 8.4–10.5)
CHLORIDE SERPL-SCNC: 105 MMOL/L — SIGNIFICANT CHANGE UP (ref 96–108)
CHLORIDE SERPL-SCNC: 105 MMOL/L — SIGNIFICANT CHANGE UP (ref 96–108)
CO2 SERPL-SCNC: 20 MMOL/L — LOW (ref 22–31)
CO2 SERPL-SCNC: 22 MMOL/L — SIGNIFICANT CHANGE UP (ref 22–31)
CREAT SERPL-MCNC: 0.66 MG/DL — SIGNIFICANT CHANGE UP (ref 0.5–1.3)
CREAT SERPL-MCNC: 0.67 MG/DL — SIGNIFICANT CHANGE UP (ref 0.5–1.3)
EGFR: 107 ML/MIN/1.73M2 — SIGNIFICANT CHANGE UP
EGFR: 107 ML/MIN/1.73M2 — SIGNIFICANT CHANGE UP
GLUCOSE SERPL-MCNC: 123 MG/DL — HIGH (ref 70–99)
GLUCOSE SERPL-MCNC: 130 MG/DL — HIGH (ref 70–99)
HCT VFR BLD CALC: 32.2 % — LOW (ref 34.5–45)
HCT VFR BLD CALC: 33.2 % — LOW (ref 34.5–45)
HGB BLD-MCNC: 10.4 G/DL — LOW (ref 11.5–15.5)
HGB BLD-MCNC: 10.6 G/DL — LOW (ref 11.5–15.5)
MAGNESIUM SERPL-MCNC: 1.6 MG/DL — SIGNIFICANT CHANGE UP (ref 1.6–2.6)
MAGNESIUM SERPL-MCNC: 2.3 MG/DL — SIGNIFICANT CHANGE UP (ref 1.6–2.6)
MCHC RBC-ENTMCNC: 26.4 PG — LOW (ref 27–34)
MCHC RBC-ENTMCNC: 26.5 PG — LOW (ref 27–34)
MCHC RBC-ENTMCNC: 31.9 GM/DL — LOW (ref 32–36)
MCHC RBC-ENTMCNC: 32.3 GM/DL — SIGNIFICANT CHANGE UP (ref 32–36)
MCV RBC AUTO: 82.1 FL — SIGNIFICANT CHANGE UP (ref 80–100)
MCV RBC AUTO: 82.6 FL — SIGNIFICANT CHANGE UP (ref 80–100)
NRBC # BLD: 0 /100 WBCS — SIGNIFICANT CHANGE UP (ref 0–0)
NRBC # BLD: 0 /100 WBCS — SIGNIFICANT CHANGE UP (ref 0–0)
PHOSPHATE SERPL-MCNC: 3.7 MG/DL — SIGNIFICANT CHANGE UP (ref 2.5–4.5)
PHOSPHATE SERPL-MCNC: 4.1 MG/DL — SIGNIFICANT CHANGE UP (ref 2.5–4.5)
PLATELET # BLD AUTO: 195 K/UL — SIGNIFICANT CHANGE UP (ref 150–400)
PLATELET # BLD AUTO: 213 K/UL — SIGNIFICANT CHANGE UP (ref 150–400)
POTASSIUM SERPL-MCNC: 4 MMOL/L — SIGNIFICANT CHANGE UP (ref 3.5–5.3)
POTASSIUM SERPL-MCNC: 4.1 MMOL/L — SIGNIFICANT CHANGE UP (ref 3.5–5.3)
POTASSIUM SERPL-SCNC: 4 MMOL/L — SIGNIFICANT CHANGE UP (ref 3.5–5.3)
POTASSIUM SERPL-SCNC: 4.1 MMOL/L — SIGNIFICANT CHANGE UP (ref 3.5–5.3)
PROT SERPL-MCNC: 6.4 G/DL — SIGNIFICANT CHANGE UP (ref 6–8.3)
PROT SERPL-MCNC: 6.8 G/DL — SIGNIFICANT CHANGE UP (ref 6–8.3)
PTH-INTACT IO % DIF SERPL: 8 PG/ML — LOW (ref 15–65)
PTH-INTACT IO % DIF SERPL: 8 PG/ML — LOW (ref 15–65)
RBC # BLD: 3.92 M/UL — SIGNIFICANT CHANGE UP (ref 3.8–5.2)
RBC # BLD: 4.02 M/UL — SIGNIFICANT CHANGE UP (ref 3.8–5.2)
RBC # FLD: 14.4 % — SIGNIFICANT CHANGE UP (ref 10.3–14.5)
RBC # FLD: 14.5 % — SIGNIFICANT CHANGE UP (ref 10.3–14.5)
SODIUM SERPL-SCNC: 138 MMOL/L — SIGNIFICANT CHANGE UP (ref 135–145)
SODIUM SERPL-SCNC: 140 MMOL/L — SIGNIFICANT CHANGE UP (ref 135–145)
WBC # BLD: 13 K/UL — HIGH (ref 3.8–10.5)
WBC # BLD: 14.99 K/UL — HIGH (ref 3.8–10.5)
WBC # FLD AUTO: 13 K/UL — HIGH (ref 3.8–10.5)
WBC # FLD AUTO: 14.99 K/UL — HIGH (ref 3.8–10.5)

## 2022-07-09 PROCEDURE — 88304 TISSUE EXAM BY PATHOLOGIST: CPT

## 2022-07-09 PROCEDURE — 80053 COMPREHEN METABOLIC PANEL: CPT

## 2022-07-09 PROCEDURE — 84100 ASSAY OF PHOSPHORUS: CPT

## 2022-07-09 PROCEDURE — 88331 PATH CONSLTJ SURG 1 BLK 1SPC: CPT

## 2022-07-09 PROCEDURE — 88305 TISSUE EXAM BY PATHOLOGIST: CPT

## 2022-07-09 PROCEDURE — 60500 EXPLORE PARATHYROID GLANDS: CPT | Mod: XS

## 2022-07-09 PROCEDURE — 85027 COMPLETE CBC AUTOMATED: CPT

## 2022-07-09 PROCEDURE — 36415 COLL VENOUS BLD VENIPUNCTURE: CPT

## 2022-07-09 PROCEDURE — 83735 ASSAY OF MAGNESIUM: CPT

## 2022-07-09 PROCEDURE — 42800 BIOPSY OF THROAT: CPT

## 2022-07-09 PROCEDURE — C1889: CPT

## 2022-07-09 PROCEDURE — 88307 TISSUE EXAM BY PATHOLOGIST: CPT

## 2022-07-09 PROCEDURE — 88332 PATH CONSLTJ SURG EA ADD BLK: CPT

## 2022-07-09 PROCEDURE — 60240 REMOVAL OF THYROID: CPT

## 2022-07-09 PROCEDURE — 38510 BIOPSY/REMOVAL LYMPH NODES: CPT

## 2022-07-09 PROCEDURE — 83970 ASSAY OF PARATHORMONE: CPT

## 2022-07-09 RX ORDER — MAGNESIUM SULFATE 500 MG/ML
2 VIAL (ML) INJECTION ONCE
Refills: 0 | Status: COMPLETED | OUTPATIENT
Start: 2022-07-09 | End: 2022-07-09

## 2022-07-09 RX ORDER — CALCITRIOL 0.5 UG/1
1 CAPSULE ORAL
Qty: 7 | Refills: 0
Start: 2022-07-09 | End: 2022-07-15

## 2022-07-09 RX ORDER — CALCITRIOL 0.5 UG/1
0.5 CAPSULE ORAL ONCE
Refills: 0 | Status: COMPLETED | OUTPATIENT
Start: 2022-07-09 | End: 2022-07-09

## 2022-07-09 RX ADMIN — Medication 5 MILLIGRAM(S): at 05:16

## 2022-07-09 RX ADMIN — CALCITRIOL 0.5 MICROGRAM(S): 0.5 CAPSULE ORAL at 10:12

## 2022-07-09 RX ADMIN — ONDANSETRON 4 MILLIGRAM(S): 8 TABLET, FILM COATED ORAL at 00:27

## 2022-07-09 RX ADMIN — Medication 5 MILLIGRAM(S): at 13:51

## 2022-07-09 RX ADMIN — HEPARIN SODIUM 5000 UNIT(S): 5000 INJECTION INTRAVENOUS; SUBCUTANEOUS at 00:27

## 2022-07-09 RX ADMIN — Medication 25 GRAM(S): at 09:24

## 2022-07-09 NOTE — ASU DISCHARGE PLAN (ADULT/PEDIATRIC) - CARE PROVIDER_API CALL
Elvis Valenzuela)  Otolaryngology  [address], [address], [place] [address]  Phone: [phone]  Fax: [phone]  Follow Up Time: 1 week

## 2022-07-09 NOTE — ASU DISCHARGE PLAN (ADULT/PEDIATRIC) - ASU DC SPECIAL INSTRUCTIONSFT
Please call you surgeon if you experience any numbness or tingling around your mouth, in your fingers or toes, or anywhere. This may be a sign of low blood calcium levels. If you have muscle cramping and or curling of your fingers or toes, this Please call you surgeon if you experience any numbness or tingling around your mouth, in your fingers or toes, or anywhere. This may be a sign of low blood calcium levels. If you have muscle cramping and or curling of your fingers or toes, this could be even more seriously low blood calcium levels. This can be a life-threatening problem and you must seek medical attention immediately. You should not drive if you are having these symptoms. You should also contact your surgeon if you have fever over 101.5 degrees F, foul smelling discharge from your incision, a large amount of bleeding, more than expected swelling of your neck, an increase warmth or redness around the incision, worsening pain, or choking/coughing with food or liquid.    Please take two tabs of Citracal daily.   Pleast take one tab of Calcitriol daily.  Please take acetaminophen 650mg every 6 hrs as needed for mild to moderate pain.

## 2022-07-09 NOTE — DISCHARGE NOTE NURSING/CASE MANAGEMENT/SOCIAL WORK - PATIENT PORTAL LINK FT
You can access the FollowMyHealth Patient Portal offered by [facility] by registering at the following website: [email or web address]. By joining [facility]’s FollowMyHealth portal, you will also be able to view your health information using other applications (apps) compatible with our system.

## 2022-07-09 NOTE — PROGRESS NOTE ADULT - SUBJECTIVE AND OBJECTIVE BOX
Procedure: total thyroidectomy, RLL parathyroidectomy, central neck dissection, tongue bx   Surgeon: Dr. Valenzuela    S: Pt has no complaints. Denies CP, SOB, PERALES, calf tenderness. Pain controlled with medication. Denies any nausea or vomiting, no paresthesias     O:  T(C): 36.3 (07-08-22 @ 19:30), Max: 36.4 (07-08-22 @ 18:05)  T(F): 97.4 (07-08-22 @ 19:30), Max: 97.6 (07-08-22 @ 18:05)  HR: 94 (07-08-22 @ 19:30) (94 - 97)  BP: 140/69 (07-08-22 @ 19:30) (133/78 - 145/81)  RR: 17 (07-08-22 @ 19:30) (17 - 18)  SpO2: 99% (07-08-22 @ 19:30) (99% - 100%)  Wt(kg): --    Gen: NAD, resting comfortably in bed  C/V: NSR  HEENT: trachea midline, neck soft and supple, no edema, crepitus or erythema, dressing in place, drain with minimal serosanguinous output, no paresthesias  Pulm: Nonlabored breathing, no respiratory distress  Abd: soft, NT/ND Incision:  Extrem: WWP, no calf edema, SCDs in place  
STATUS POST:  Total thyroidectomy    POST OPERATIVE DAY #: 1    SUBJECTIVE: Pt seen and examined at bedside this am by surgery team. She is doing well this morning. Tolerating diet, pain well controlled. Denies f/n/v/cp/sob.    MEDICATIONS  (STANDING):  acetaminophen     Tablet .. 650 milliGRAM(s) Oral Once  calcitriol   Capsule 0.5 MICROGram(s) Oral Once  Citracal (650mg/500 units) 2 Tablet(s) 2 Capsule(s) Oral two times a day  lactated ringers. 1000 milliLiter(s) (115 mL/Hr) IV Continuous <Continuous>    MEDICATIONS  (PRN):  HYDROmorphone  Injectable 0.5 milliGRAM(s) IV Push Once PRN Severe Pain (7 - 10)  oxyCODONE    IR 2.5 milliGRAM(s) Oral every 6 hours PRN Mild Pain (1 - 3)  oxyCODONE    IR 5 milliGRAM(s) Oral every 6 hours PRN Moderate Pain (4 - 6)      Vital Signs Last 24 Hrs  T(C): 36.9 (09 Jul 2022 13:08), Max: 37.1 (08 Jul 2022 23:49)  T(F): 98.4 (09 Jul 2022 13:08), Max: 98.7 (08 Jul 2022 23:49)  HR: 80 (09 Jul 2022 13:08) (80 - 97)  BP: 132/83 (09 Jul 2022 13:08) (122/77 - 147/77)  BP(mean): 106 (08 Jul 2022 20:30) (98 - 106)  RR: 16 (09 Jul 2022 13:08) (15 - 18)  SpO2: 98% (09 Jul 2022 13:08) (96% - 100%)    Parameters below as of 09 Jul 2022 13:08  Patient On (Oxygen Delivery Method): room air        Physical Exam  General: Patient is doing well and lying in bed comfortably  Constitutional: A&Ox3  Neck: soft, appropriately tender       Incisions: no drainage, no induration, no erythema  Pulm: Nonlabored breathing, no respiratory distress  CV: Regular rate and rhythm, normal sinus rhythm  Abd:  soft, nontender, nondistended. No rebound, no guarding.   Extremities warm, well perfused, no edema  Drains: NEREYDA drain at neck    I&O's Detail    08 Jul 2022 07:01  -  09 Jul 2022 07:00  --------------------------------------------------------  IN:    Lactated Ringers: 1495 mL  Total IN: 1495 mL    OUT:    Bulb (mL): 40 mL    Voided (mL): 2200 mL  Total OUT: 2240 mL    Total NET: -745 mL      09 Jul 2022 07:01  -  09 Jul 2022 15:16  --------------------------------------------------------  IN:    Lactated Ringers: 575 mL    Oral Fluid: 320 mL  Total IN: 895 mL    OUT:    Bulb (mL): 10 mL    Voided (mL): 300 mL  Total OUT: 310 mL    Total NET: 585 mL          LABS:                        10.6   14.99 )-----------( 213      ( 09 Jul 2022 12:00 )             33.2     07-09    140  |  105  |  12  ----------------------------<  123<H>  4.1   |  20<L>  |  0.66    Ca    9.3      09 Jul 2022 12:00  Phos  4.1     07-09  Mg     2.3     07-09    TPro  6.8  /  Alb  4.0  /  TBili  0.4  /  DBili  x   /  AST  16  /  ALT  13  /  AlkPhos  84  07-09

## 2022-07-09 NOTE — PROGRESS NOTE ADULT - ASSESSMENT
50yo F PMx GERD, PSH psc, tonsillectomy, w confirmed papillary thyroid ca and primary hyperparathyroidism, now s/p Total thyroidectomy w RLL paraythyroidectomy, central neck dissection, w LNM, base of tongue bx x3. (7/8)     23hr obs   Soft diet/IVF   IVHL when tolerating   Pain + nausea control   Rocaltrol .5mg x1   Decadron q8hr x3   NEREYDA drain x1

## 2022-07-09 NOTE — DISCHARGE NOTE NURSING/CASE MANAGEMENT/SOCIAL WORK - NSDCPEFALRISK_GEN_ALL_CORE
For information on Fall & Injury Prevention, visit: [email or web address] OR  [email or web address] OR  https://www.cdc.gov/steadi/patient.html

## 2022-07-09 NOTE — ASU DISCHARGE PLAN (ADULT/PEDIATRIC) - NS MD DC FALL RISK RISK
For information on Fall & Injury Prevention, visit: [email or web address] OR  [email or web address] OR  https://www.cdc.gov/steadi/patient.html

## 2022-07-11 PROBLEM — J30.2 OTHER SEASONAL ALLERGIC RHINITIS: Chronic | Status: ACTIVE | Noted: 2022-07-07

## 2022-07-11 PROBLEM — E55.9 VITAMIN D DEFICIENCY, UNSPECIFIED: Chronic | Status: ACTIVE | Noted: 2022-07-07

## 2022-07-12 ENCOUNTER — APPOINTMENT (OUTPATIENT)
Dept: OTOLARYNGOLOGY | Facility: CLINIC | Age: 52
End: 2022-07-12

## 2022-07-12 VITALS
BODY MASS INDEX: 25.78 KG/M2 | WEIGHT: 164.24 LBS | OXYGEN SATURATION: 99 % | HEIGHT: 67 IN | SYSTOLIC BLOOD PRESSURE: 123 MMHG | RESPIRATION RATE: 17 BRPM | HEART RATE: 77 BPM | TEMPERATURE: 99.2 F | DIASTOLIC BLOOD PRESSURE: 78 MMHG

## 2022-07-12 LAB — SURGICAL PATHOLOGY STUDY: SIGNIFICANT CHANGE UP

## 2022-07-12 PROCEDURE — 99024 POSTOP FOLLOW-UP VISIT: CPT

## 2022-07-12 PROCEDURE — 31575 DIAGNOSTIC LARYNGOSCOPY: CPT | Mod: 58

## 2022-07-12 NOTE — HISTORY OF PRESENT ILLNESS
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  In March 2021 her calcium was 10.8 mg/dl, with iPTH of 137 pg/ml. Last month her calcium bumped up to 11.6 mg/dl. Her eGFR is normal at 95U/L.  A 24-hour urine collection for calcium was obtained this month and elevated to 455 mg/24h. Kary denies calcium supplements, HCTZ or past use of Lithium Carbonate. She had been on vitamin D3 1,000 IU daily but ran out recently. There is no family history of nephrolithiasis or renal disease.  There is no history of fragility bone fractures. Her DEXA scan was normal last year. Other than fatigue, memory loss, brain fog, right hip pain, she denies  muscle weakness, generalized bone aches, joint pain, depression,  nausea, vomiting, abdominal pain, constipation, polyuria/ polydipsia, nephrolithiasis, peptic ulcer disease or pancreatitis. She has a hx of mild GERD and H Pylori treated in the past.  She does have morning urinary incontinence. She was recently hypothyroid during her last pregnancy in 2013 and briefly put on thyroid hormone replacement but then recovered and is now euthyroid.  She denies recent shortness of breath, voice changes, dysphagia, anterior neck pain, neck pressure or mass. There is no family history of thyroid cancer. She has a known radiation exposure in her youth (age 16 from [place]). Vitamin D level is slightly low at 25.5 ng/mL in May.  She has not had any neck cross sectional imaging to date.  She is not aware of having a COVID-19 infection. She denies fever, body aches, cough, cyanosis, chest burning, anosmia or recent known COVID exposures.  All family members at home are well. She is fully vaccinated and boosted. \par  [FreeTextEntry1] : Kary returns for a first post op visit after an uncomplicated total thyroidectomy and parathyroidectomy on 07/08/2022.    She denies paresthesias at the moment but did have some paresthesias in her lower extremity and fingers over the weekend.  She was taking 2-3  Citracals daily and one 0.25 mcg Rocaltrol.  Hospital discharge calcium was normal but PTH low at ~ 8 pg/ml were normal.  She is tolerating a soft diet.  Voice is still mildly hoarse. Surgical pathology is still pending.

## 2022-07-12 NOTE — CONSULT LETTER
[Dear  ___] : Dear  [unfilled], [Consult Letter:] : I had the pleasure of evaluating your patient, [unfilled]. [Please see my note below.] : Please see my note below. [Consult Closing:] : Thank you very much for allowing me to participate in the care of this patient.  If you have any questions, please do not hesitate to contact me. [Sincerely,] : Sincerely, [DrTracie  ___] : Dr. ZAPIEN [FreeTextEntry3] : \par Elvis Valenzuela M.D., FACS, ECNU\par Director Center for Thyroid & Parathyroid Surgery at [facility]\par [facility] [place]\par Certified in Thyroid/Parathyroid/Neck Ultrasound, ECNU/ AIUM\par , Department of Otolaryngology\par [facility] [employer] at [facility]\par

## 2022-07-12 NOTE — PHYSICAL EXAM
[Normal] : no mass and no adenopathy [de-identified] : The neck is flat without seroma or hematoma. The subcuticular suture was removed. The voice is raspy.  A Chvostek sign was present.

## 2022-07-12 NOTE — REASON FOR VISIT
[FreeTextEntry2] : a first PO visit after total thyroidectomy and parathyroidectomy for papillary thyroid carcinoma. [FreeTextEntry1] : Referred by Zoe Monson MD Endocrine, PCP is Marquita Canada MD

## 2022-07-12 NOTE — PROCEDURE
[Image(s) Captured] : image(s) captured and filed [Unable to Cooperate with Mirror] : patient unable to cooperate with mirror [Gag Reflex] : gag reflex preventing mirror examination [Topical Lidocaine] : topical lidocaine [Oxymetazoline HCl] : oxymetazoline HCl [Flexible Endoscope] : examined with the flexible endoscope [Serial Number: ___] : Serial Number: [unfilled] [Hoarseness] : hoarseness not clearly evaluated by indirect laryngoscopy [de-identified] : The nasal septum is minimally deviated to the right. There are no masses or polyps and the nasal mucosa and secretions are normal. The choanae and posterior nasopharynx are normal without masses or drainage. The Eustachian tube orifices appear patent. The pharynx, including the posterior and lateral pharyngeal walls, the vallecula and base of tongue are normal without ulcerations, lesions or masses.  The lingual tonsils are hyperplastic with slight asymmetry right greater than left without discrete mass noted. The hypopharynx including the pyriform sinuses open well without pooling of secretions, mucosal lesions or masses. The supraglottic larynx including the epiglottis, petiole, arytenoids, glossoepiglottic, aryepiglottic and pharyngoepiglottic folds are normal without mucosal lesions, ulcerations or masses. The glottis reveals normal false vocal folds. The true vocal folds are hyperemic but tense and of equal length, without paralysis, having symmetric mobility on adduction and abduction. There are no mucosal lesions, nodules, cysts, erythroplasia or leukoplakia. The posterior cricoid area has healthy pink mucosa in the interarytenoid area and esophageal inlet. There is mild thickening/pachydermia of the interarytenoid mucosa suggestive of posterior laryngitis from laryngopharyngeal acid reflux disease. The trachea is clear without narrowing in the immediate subglottic region, without deviation or lesions.  [de-identified] : post op parathyroidectomy and total thyroidectomy

## 2022-07-13 LAB
25(OH)D3 SERPL-MCNC: 25 NG/ML
ALBUMIN SERPL ELPH-MCNC: 4.6 G/DL
ALP BLD-CCNC: 84 U/L
ALT SERPL-CCNC: 17 U/L
ANION GAP SERPL CALC-SCNC: 15 MMOL/L
AST SERPL-CCNC: 16 U/L
BILIRUB SERPL-MCNC: 0.2 MG/DL
BUN SERPL-MCNC: 22 MG/DL
CALCIUM SERPL-MCNC: 8.3 MG/DL
CALCIUM SERPL-MCNC: 8.3 MG/DL
CHLORIDE SERPL-SCNC: 102 MMOL/L
CO2 SERPL-SCNC: 23 MMOL/L
CREAT SERPL-MCNC: 0.71 MG/DL
EGFR: 104 ML/MIN/1.73M2
GLUCOSE SERPL-MCNC: 88 MG/DL
PARATHYROID HORMONE INTACT: 4 PG/ML
PHOSPHATE SERPL-MCNC: 7.3 MG/DL
POTASSIUM SERPL-SCNC: 4.5 MMOL/L
PROT SERPL-MCNC: 6.8 G/DL
SODIUM SERPL-SCNC: 140 MMOL/L

## 2022-07-19 ENCOUNTER — APPOINTMENT (OUTPATIENT)
Dept: OTOLARYNGOLOGY | Facility: CLINIC | Age: 52
End: 2022-07-19

## 2022-07-19 VITALS
TEMPERATURE: 97.9 F | HEART RATE: 73 BPM | WEIGHT: 293 LBS | BODY MASS INDEX: 45.99 KG/M2 | OXYGEN SATURATION: 96 % | HEIGHT: 67 IN | SYSTOLIC BLOOD PRESSURE: 124 MMHG | DIASTOLIC BLOOD PRESSURE: 83 MMHG

## 2022-07-19 LAB
25(OH)D3 SERPL-MCNC: 32.8 NG/ML
ALBUMIN SERPL ELPH-MCNC: 4.4 G/DL
ALP BLD-CCNC: 89 U/L
ALT SERPL-CCNC: 14 U/L
ANION GAP SERPL CALC-SCNC: 12 MMOL/L
AST SERPL-CCNC: 13 U/L
BILIRUB SERPL-MCNC: 0.2 MG/DL
BUN SERPL-MCNC: 15 MG/DL
CALCIUM SERPL-MCNC: 9.4 MG/DL
CALCIUM SERPL-MCNC: 9.4 MG/DL
CHLORIDE SERPL-SCNC: 103 MMOL/L
CO2 SERPL-SCNC: 25 MMOL/L
CREAT SERPL-MCNC: 0.85 MG/DL
EGFR: 84 ML/MIN/1.73M2
GLUCOSE SERPL-MCNC: 93 MG/DL
PARATHYROID HORMONE INTACT: 9 PG/ML
PHOSPHATE SERPL-MCNC: 4.6 MG/DL
POTASSIUM SERPL-SCNC: 4.6 MMOL/L
PROT SERPL-MCNC: 6.9 G/DL
SODIUM SERPL-SCNC: 140 MMOL/L

## 2022-07-19 PROCEDURE — 99024 POSTOP FOLLOW-UP VISIT: CPT

## 2022-07-19 NOTE — PHYSICAL EXAM
[Normal] : no mass and no adenopathy [de-identified] : The neck is flat without seroma or hematoma.  Incision is healing nicely and Steri-Strips are intact.

## 2022-07-19 NOTE — REASON FOR VISIT
[FreeTextEntry2] : a second PO visit after total thyroidectomy and parathyroidectomy for papillary thyroid carcinoma. [FreeTextEntry1] : Referred by Zoe Monson MD Endocrine, PCP is Marquita Canada MD

## 2022-07-19 NOTE — HISTORY OF PRESENT ILLNESS
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  In March 2021 her calcium was 10.8 mg/dl, with iPTH of 137 pg/ml. Last month her calcium bumped up to 11.6 mg/dl. Her eGFR is normal at 95U/L.  A 24-hour urine collection for calcium was obtained this month and elevated to 455 mg/24h. Kary denies calcium supplements, HCTZ or past use of Lithium Carbonate. She had been on vitamin D3 1,000 IU daily but ran out recently. There is no family history of nephrolithiasis or renal disease.  There is no history of fragility bone fractures. Her DEXA scan was normal last year. Other than fatigue, memory loss, brain fog, right hip pain, she denies  muscle weakness, generalized bone aches, joint pain, depression,  nausea, vomiting, abdominal pain, constipation, polyuria/ polydipsia, nephrolithiasis, peptic ulcer disease or pancreatitis. She has a hx of mild GERD and H Pylori treated in the past.  She does have morning urinary incontinence. She was recently hypothyroid during her last pregnancy in 2013 and briefly put on thyroid hormone replacement but then recovered and is now euthyroid.  She denies recent shortness of breath, voice changes, dysphagia, anterior neck pain, neck pressure or mass. There is no family history of thyroid cancer. She has a known radiation exposure in her youth (age 16 from [place]). Vitamin D level is slightly low at 25.5 ng/mL in May.  She has not had any neck cross sectional imaging to date.  She is not aware of having a COVID-19 infection. She denies fever, body aches, cough, cyanosis, chest burning, anosmia or recent known COVID exposures.  All family members at home are well. She is fully vaccinated and boosted. \par  [FreeTextEntry1] : Kary returns for a 2nd post op visit after an uncomplicated total thyroidectomy and parathyroidectomy on 07/08/2022.    She denies any further paresthesias.  She is now taking 6 Citracals daily now, and one 0.5 mcg Rocaltrol with D3 2K IU daily.  Post op calcium last visit was 8.3 mg/dl, PTH low at 4 pg/ml but taking only 2-3 Citracals daily and only .25 calcitriol. She is tolerating a reg diet.  Voice is less hoarse. Vocal fold mobility was laura last visit.  Surgical pathology was favorable: 2.4 cm, unifocal, classic variant, papillary thyroid carcinoma with minor) 5%) solid component.  Angioinvasion, lymphatic invasion and extrathyroidal extension were all not identified and margins were negative for carcinoma.  All regional lymph nodes were negative for tumor for total of 7 examined.  AJCC stage I, pT1b, pN0, pMx.  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  There was no evidence of malignancy.  The lingual tonsil biopsy was negative for malignancy with only benign lymphoid tissue present.

## 2022-07-19 NOTE — CONSULT LETTER
[Dear  ___] : Dear  [unfilled], [Consult Letter:] : I had the pleasure of evaluating your patient, [unfilled]. [Please see my note below.] : Please see my note below. [Consult Closing:] : Thank you very much for allowing me to participate in the care of this patient.  If you have any questions, please do not hesitate to contact me. [Sincerely,] : Sincerely, [DrTracie  ___] : Dr. ZAPIEN [FreeTextEntry3] : \par Elvis Valenzuela M.D., FACS, ECNU\par Director Center for Thyroid & Parathyroid Surgery at [facility]\par [facility] [place]\par Certified in Thyroid/Parathyroid/Neck Ultrasound, ECNU/ AIUM\par , Department of Otolaryngology\par [facility] [employer] at [facility]\par

## 2022-08-04 ENCOUNTER — APPOINTMENT (OUTPATIENT)
Dept: ORTHOPEDIC SURGERY | Facility: CLINIC | Age: 52
End: 2022-08-04

## 2022-08-04 VITALS — BODY MASS INDEX: 27.47 KG/M2 | WEIGHT: 175 LBS | HEIGHT: 67 IN

## 2022-08-04 DIAGNOSIS — M25.551 PAIN IN RIGHT HIP: ICD-10-CM

## 2022-08-04 PROCEDURE — 99203 OFFICE O/P NEW LOW 30 MIN: CPT

## 2022-08-04 PROCEDURE — 73521 X-RAY EXAM HIPS BI 2 VIEWS: CPT

## 2022-08-04 NOTE — PHYSICAL EXAM
[de-identified] : Right hip has full passive internal and external rotation at 90 degrees with no restriction block or pain.  She is neurovascular intact distally negative straight leg raising test. [de-identified] : Hip x-rays were ordered today.  AP and lateral of both hips were obtained no evidence of arthritis bony abnormalities/loose bodies.  Under close inspection of the lateral views of each hip the right hip seems to have more of a "broad shoulder" consistent with mild cam type impingement.

## 2022-08-04 NOTE — REASON FOR VISIT
[Initial Visit] : an initial visit for [Hip Pain] : hip pain [FreeTextEntry2] : Rt hip pain started in April. pain off and on during certain movements, she states the pain feels like a snapping feeling.Pt stated her most recent surgery was thyroid surgery in 07/2022

## 2022-08-04 NOTE — DISCUSSION/SUMMARY
[de-identified] : We discussed at length the underlying etiology of this patient's discomfort.  She seems to have a anatomic mechanical basis for mild cam type impingement which in turn may have caused some fraying or tearing of the right acetabular labrum.  I have reassured the patient that with focused physical therapy on hip strengthening along with occasional anti-inflammatory use she should be able to reduce her symptoms significantly.  I also indicated to the patient that this could recur in the future so she may undergo cycles of pain-free relief with resumption of symptoms every few years which would require returning to her physical therapy regimen.  Patient will follow-up with me in 4 to 5 weeks if not thoroughly improved at that point an MRI of the hip to evaluate her for both size and location of the labral tear may be warranted.

## 2022-08-09 ENCOUNTER — APPOINTMENT (OUTPATIENT)
Dept: OTOLARYNGOLOGY | Facility: CLINIC | Age: 52
End: 2022-08-09

## 2022-08-09 VITALS
HEIGHT: 67 IN | HEART RATE: 74 BPM | TEMPERATURE: 98 F | OXYGEN SATURATION: 97 % | DIASTOLIC BLOOD PRESSURE: 77 MMHG | BODY MASS INDEX: 26.68 KG/M2 | WEIGHT: 170 LBS | RESPIRATION RATE: 16 BRPM | SYSTOLIC BLOOD PRESSURE: 118 MMHG

## 2022-08-09 PROCEDURE — 99024 POSTOP FOLLOW-UP VISIT: CPT

## 2022-08-09 NOTE — HISTORY OF PRESENT ILLNESS
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  In March 2021 her calcium was 10.8 mg/dl, with iPTH of 137 pg/ml. Last month her calcium bumped up to 11.6 mg/dl. Her eGFR is normal at 95U/L.  A 24-hour urine collection for calcium was obtained this month and elevated to 455 mg/24h. Kary denies calcium supplements, HCTZ or past use of Lithium Carbonate. She had been on vitamin D3 1,000 IU daily but ran out recently. There is no family history of nephrolithiasis or renal disease.  There is no history of fragility bone fractures. Her DEXA scan was normal last year. Other than fatigue, memory loss, brain fog, right hip pain, she denies  muscle weakness, generalized bone aches, joint pain, depression,  nausea, vomiting, abdominal pain, constipation, polyuria/ polydipsia, nephrolithiasis, peptic ulcer disease or pancreatitis. She has a hx of mild GERD and H Pylori treated in the past.  She does have morning urinary incontinence. She was recently hypothyroid during her last pregnancy in 2013 and briefly put on thyroid hormone replacement but then recovered and is now euthyroid.  She denies recent shortness of breath, voice changes, dysphagia, anterior neck pain, neck pressure or mass. There is no family history of thyroid cancer. She has a known radiation exposure in her youth (age 16 from [place]). Vitamin D level is slightly low at 25.5 ng/mL in May.  She has not had any neck cross sectional imaging to date.  She is not aware of having a COVID-19 infection. She denies fever, body aches, cough, cyanosis, chest burning, anosmia or recent known COVID exposures.  All family members at home are well. She is fully vaccinated and boosted. \par  [FreeTextEntry1] : Kary returns for a 3rd post op visit after an uncomplicated total thyroidectomy and parathyroidectomy on 07/08/2022.    She denies any further paresthesias.  She is now taking 4 Citracals daily now, and Vit D3 2K IU daily.  Post op calcium last visit was 9.4 mg/dl, PTH low at 9 pg/ml.  She is tolerating a reg diet.  Voice is less hoarse. Vocal fold mobility was normal last visit.  Surgical pathology was favorable: 2.4 cm, unifocal, classic variant, papillary thyroid carcinoma with (minor 5%) solid component.  Angioinvasion, lymphatic invasion and extrathyroidal extension were all not identified and margins were negative for carcinoma.  All regional lymph nodes were negative for tumor for total of 7 examined.  AJCC stage I, pT1b, pN0, pMx.  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  There was no evidence of malignancy.  The lingual tonsil biopsy was negative for malignancy with only benign lymphoid tissue present.  She feels well and is tolerating LT4 137 mcgs daily.

## 2022-08-09 NOTE — PHYSICAL EXAM
[Normal] : no mass and no adenopathy [de-identified] : The neck is flat without seroma or hematoma.  Incision is healing nicely and Steri-Strips replaced.

## 2022-08-09 NOTE — CONSULT LETTER
[Dear  ___] : Dear  [unfilled], [Consult Letter:] : I had the pleasure of evaluating your patient, [unfilled]. [Please see my note below.] : Please see my note below. [Consult Closing:] : Thank you very much for allowing me to participate in the care of this patient.  If you have any questions, please do not hesitate to contact me. [Sincerely,] : Sincerely, [DrTracie  ___] : Dr. ZAPIEN [FreeTextEntry3] : \par Elvis Valenzuela M.D., FACS, ECNU\par Director Center for [facility]roid [facility]athyroid [facility] at [facility]\par [facility] [place]\par Certified in Thyroid/Parathyroid/Neck Ultrasound, ECNU/ AIUM\par , Department of Otolaryngology\par [facility] at [facility]\par

## 2022-08-09 NOTE — REASON FOR VISIT
[FreeTextEntry2] : a 3rd PO visit after total thyroidectomy and parathyroidectomy for papillary thyroid carcinoma. [FreeTextEntry1] : Referred by Zoe Monson MD Endocrine, PCP is Marquita Canada MD

## 2022-08-11 LAB
ALBUMIN SERPL ELPH-MCNC: 4.7 G/DL
CALCIUM SERPL-MCNC: 9.5 MG/DL
CALCIUM SERPL-MCNC: 9.5 MG/DL
PARATHYROID HORMONE INTACT: 24 PG/ML
PHOSPHATE SERPL-MCNC: 3.9 MG/DL
T4 FREE SERPL-MCNC: 1.3 NG/DL
TSH SERPL-ACNC: 26.5 UIU/ML

## 2022-09-12 ENCOUNTER — APPOINTMENT (OUTPATIENT)
Dept: OTOLARYNGOLOGY | Facility: CLINIC | Age: 52
End: 2022-09-12

## 2022-09-12 VITALS
WEIGHT: 178 LBS | TEMPERATURE: 98.2 F | HEIGHT: 67 IN | SYSTOLIC BLOOD PRESSURE: 129 MMHG | BODY MASS INDEX: 27.94 KG/M2 | OXYGEN SATURATION: 98 % | HEART RATE: 68 BPM | DIASTOLIC BLOOD PRESSURE: 81 MMHG | RESPIRATION RATE: 16 BRPM

## 2022-09-12 DIAGNOSIS — L91.0 HYPERTROPHIC SCAR: ICD-10-CM

## 2022-09-12 PROCEDURE — 99024 POSTOP FOLLOW-UP VISIT: CPT

## 2022-09-12 PROCEDURE — 11900 INJECT SKIN LESIONS </W 7: CPT | Mod: 78

## 2022-09-12 NOTE — CONSULT LETTER
[Dear  ___] : Dear  [unfilled], [Consult Letter:] : I had the pleasure of evaluating your patient, [unfilled]. [Please see my note below.] : Please see my note below. [Consult Closing:] : Thank you very much for allowing me to participate in the care of this patient.  If you have any questions, please do not hesitate to contact me. [Sincerely,] : Sincerely, [DrTracie  ___] : Dr. ZAPIEN [FreeTextEntry3] : \par Elvis Valenzuela M.D., FACS, ECNU\par Director Center for [facility]roid [facility]athyroid [facility] at [facility]\par [facility] [place]\par Certified in Thyroid/Parathyroid/Neck Ultrasound, ECNU/ AIUM\par , Department of Otolaryngology\par [facility] [employer] [facility] at [facility]\par

## 2022-09-12 NOTE — REASON FOR VISIT
[FreeTextEntry2] : a PO visit after total thyroidectomy and parathyroidectomy for papillary thyroid carcinoma. [FreeTextEntry1] : Referred by Zoe Monson MD Endocrine, PCP is Marquita Canada MD

## 2022-09-12 NOTE — HISTORY OF PRESENT ILLNESS
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  In March 2021 her calcium was 10.8 mg/dl, with iPTH of 137 pg/ml. Last month her calcium bumped up to 11.6 mg/dl. Her eGFR is normal at 95U/L.  A 24-hour urine collection for calcium was obtained this month and elevated to 455 mg/24h. Kary denies calcium supplements, HCTZ or past use of Lithium Carbonate. She had been on vitamin D3 1,000 IU daily but ran out recently. There is no family history of nephrolithiasis or renal disease.  There is no history of fragility bone fractures. Her DEXA scan was normal last year. Other than fatigue, memory loss, brain fog, right hip pain, she denies  muscle weakness, generalized bone aches, joint pain, depression,  nausea, vomiting, abdominal pain, constipation, polyuria/ polydipsia, nephrolithiasis, peptic ulcer disease or pancreatitis. She has a hx of mild GERD and H Pylori treated in the past.  She does have morning urinary incontinence. She was recently hypothyroid during her last pregnancy in 2013 and briefly put on thyroid hormone replacement but then recovered and is now euthyroid.  She denies recent shortness of breath, voice changes, dysphagia, anterior neck pain, neck pressure or mass. There is no family history of thyroid cancer. She has a known radiation exposure in her youth (age 16 from [place]). Vitamin D level is slightly low at 25.5 ng/mL in May.  She has not had any neck cross sectional imaging to date.  She is not aware of having a COVID-19 infection. She denies fever, body aches, cough, cyanosis, chest burning, anosmia or recent known COVID exposures.  All family members at home are well. She is fully vaccinated and boosted. \par  [FreeTextEntry1] : Kary returns for a 4th post op visit after an uncomplicated total thyroidectomy and parathyroidectomy on 07/08/2022.    She denies any further paresthesias.  She is now only taking Vit D3 2K IU daily.  Repeat calcium last visit was 9.5 mg/dl, PTH nl at 24 pg/ml.  She is tolerating a reg diet.  Voice is less hoarse. Vocal fold mobility was normal post op.  Surgical pathology was favorable: 2.4 cm, unifocal, classic variant, papillary thyroid carcinoma with (minor 5%) solid component.  Angioinvasion, lymphatic invasion and extrathyroidal extension were all not identified and margins were negative for carcinoma.  All regional lymph nodes were negative for tumor for total of 7 examined.  AJCC stage I, pT1b, pN0, pMx.  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  There was no evidence of malignancy.  A right lingual tonsil biopsy for asymmetry was negative for malignancy with only benign lymphoid tissue present.  She feels well and is tolerating LT4 137 mcgs daily.  TSH last month was high at 26.5 and she has now been taking an additional 1/2 tablet on Sundays since 8/11/22.  To be presented at the Endocrine tumor board tomorrow.

## 2022-09-12 NOTE — PHYSICAL EXAM
[Normal] : no mass and no adenopathy [de-identified] : The neck is flat without seroma or hematoma.  Incision is healing nicely but with lateral hypertrophic scar and injected with 10 mg, .5 cc Kenalog.

## 2022-09-14 ENCOUNTER — LABORATORY RESULT (OUTPATIENT)
Age: 52
End: 2022-09-14

## 2022-09-14 RX ORDER — LEVOTHYROXINE SODIUM 137 UG/1
137 TABLET ORAL DAILY
Qty: 90 | Refills: 3 | Status: DISCONTINUED | COMMUNITY
Start: 2022-07-12 | End: 2022-09-14

## 2022-09-14 RX ORDER — ACETAMINOPHEN AND CODEINE 300; 30 MG/1; MG/1
300-30 TABLET ORAL
Qty: 40 | Refills: 0 | Status: COMPLETED | COMMUNITY
Start: 2022-07-05 | End: 2022-09-14

## 2022-09-14 RX ORDER — CALCITRIOL 0.5 UG/1
0.5 CAPSULE, LIQUID FILLED ORAL
Qty: 14 | Refills: 0 | Status: DISCONTINUED | COMMUNITY
Start: 2022-07-13 | End: 2022-09-14

## 2022-09-14 RX ORDER — CALCITRIOL 0.25 UG/1
0.25 CAPSULE, LIQUID FILLED ORAL
Qty: 7 | Refills: 0 | Status: COMPLETED | COMMUNITY
Start: 2022-07-09 | End: 2022-09-14

## 2022-09-14 RX ORDER — AZITHROMYCIN 500 MG/1
500 TABLET, FILM COATED ORAL DAILY
Qty: 1 | Refills: 0 | Status: COMPLETED | COMMUNITY
Start: 2022-07-05 | End: 2022-09-14

## 2022-09-15 LAB
25(OH)D3 SERPL-MCNC: 45.6 NG/ML
ALBUMIN SERPL ELPH-MCNC: 4.4 G/DL
ALP BLD-CCNC: 65 U/L
ALT SERPL-CCNC: 21 U/L
ANION GAP SERPL CALC-SCNC: 13 MMOL/L
AST SERPL-CCNC: 20 U/L
BILIRUB SERPL-MCNC: 0.2 MG/DL
BUN SERPL-MCNC: 12 MG/DL
CALCIUM SERPL-MCNC: 8.5 MG/DL
CALCIUM SERPL-MCNC: 8.5 MG/DL
CHLORIDE SERPL-SCNC: 102 MMOL/L
CO2 SERPL-SCNC: 23 MMOL/L
CREAT SERPL-MCNC: 0.7 MG/DL
EGFR: 106 ML/MIN/1.73M2
GLUCOSE SERPL-MCNC: 104 MG/DL
PARATHYROID HORMONE INTACT: 25 PG/ML
POTASSIUM SERPL-SCNC: 4.9 MMOL/L
PROT SERPL-MCNC: 6.7 G/DL
SODIUM SERPL-SCNC: 137 MMOL/L
T3FREE SERPL-MCNC: 2.29 PG/ML
T4 FREE SERPL-MCNC: 1.2 NG/DL
THYROGLOB AB SERPL-ACNC: <20 IU/ML
THYROGLOB SERPL-MCNC: <0.2 NG/ML
TSH SERPL-ACNC: 20.7 UIU/ML

## 2022-09-20 ENCOUNTER — APPOINTMENT (OUTPATIENT)
Dept: INTERNAL MEDICINE | Facility: CLINIC | Age: 52
End: 2022-09-20

## 2022-09-20 VITALS
HEART RATE: 67 BPM | OXYGEN SATURATION: 100 % | HEIGHT: 67 IN | SYSTOLIC BLOOD PRESSURE: 144 MMHG | WEIGHT: 176 LBS | TEMPERATURE: 97.9 F | BODY MASS INDEX: 27.62 KG/M2 | DIASTOLIC BLOOD PRESSURE: 82 MMHG

## 2022-09-20 DIAGNOSIS — Z85.850 PERSONAL HISTORY OF MALIGNANT NEOPLASM OF THYROID: ICD-10-CM

## 2022-09-20 PROCEDURE — 36415 COLL VENOUS BLD VENIPUNCTURE: CPT

## 2022-09-20 PROCEDURE — 99214 OFFICE O/P EST MOD 30 MIN: CPT | Mod: 25

## 2022-09-21 ENCOUNTER — APPOINTMENT (OUTPATIENT)
Dept: ULTRASOUND IMAGING | Facility: HOSPITAL | Age: 52
End: 2022-09-21

## 2022-09-21 ENCOUNTER — OUTPATIENT (OUTPATIENT)
Dept: OUTPATIENT SERVICES | Facility: HOSPITAL | Age: 52
LOS: 1 days | End: 2022-09-21
Payer: COMMERCIAL

## 2022-09-21 ENCOUNTER — RESULT REVIEW (OUTPATIENT)
Age: 52
End: 2022-09-21

## 2022-09-21 ENCOUNTER — APPOINTMENT (OUTPATIENT)
Dept: MAMMOGRAPHY | Facility: HOSPITAL | Age: 52
End: 2022-09-21

## 2022-09-21 DIAGNOSIS — Z98.890 OTHER SPECIFIED POSTPROCEDURAL STATES: Chronic | ICD-10-CM

## 2022-09-21 DIAGNOSIS — Z98.891 HISTORY OF UTERINE SCAR FROM PREVIOUS SURGERY: Chronic | ICD-10-CM

## 2022-09-21 DIAGNOSIS — Z90.89 ACQUIRED ABSENCE OF OTHER ORGANS: Chronic | ICD-10-CM

## 2022-09-21 LAB
BASOPHILS # BLD AUTO: 0.05 K/UL
BASOPHILS NFR BLD AUTO: 0.6 %
EOSINOPHIL # BLD AUTO: 0.28 K/UL
EOSINOPHIL NFR BLD AUTO: 3.6 %
FERRITIN SERPL-MCNC: 7 NG/ML
FOLATE SERPL-MCNC: >20 NG/ML
HCT VFR BLD CALC: 32.1 %
HGB BLD-MCNC: 9.5 G/DL
IMM GRANULOCYTES NFR BLD AUTO: 0.3 %
IRON SATN MFR SERPL: 5 %
IRON SERPL-MCNC: 21 UG/DL
LYMPHOCYTES # BLD AUTO: 1.72 K/UL
LYMPHOCYTES NFR BLD AUTO: 22 %
MAN DIFF?: NORMAL
MCHC RBC-ENTMCNC: 24.7 PG
MCHC RBC-ENTMCNC: 29.6 GM/DL
MCV RBC AUTO: 83.4 FL
MONOCYTES # BLD AUTO: 0.43 K/UL
MONOCYTES NFR BLD AUTO: 5.5 %
NEUTROPHILS # BLD AUTO: 5.33 K/UL
NEUTROPHILS NFR BLD AUTO: 68 %
PLATELET # BLD AUTO: 234 K/UL
RBC # BLD: 3.85 M/UL
RBC # FLD: 15.9 %
TIBC SERPL-MCNC: 474 UG/DL
UIBC SERPL-MCNC: 452 UG/DL
VIT B12 SERPL-MCNC: 622 PG/ML
WBC # FLD AUTO: 7.83 K/UL

## 2022-09-21 PROCEDURE — 77063 BREAST TOMOSYNTHESIS BI: CPT | Mod: 26

## 2022-09-21 PROCEDURE — 77063 BREAST TOMOSYNTHESIS BI: CPT

## 2022-09-21 PROCEDURE — 77067 SCR MAMMO BI INCL CAD: CPT | Mod: 26

## 2022-09-21 PROCEDURE — 76641 ULTRASOUND BREAST COMPLETE: CPT

## 2022-09-21 PROCEDURE — 76641 ULTRASOUND BREAST COMPLETE: CPT | Mod: 26,50

## 2022-09-21 PROCEDURE — 77067 SCR MAMMO BI INCL CAD: CPT

## 2022-10-09 ENCOUNTER — EMERGENCY (EMERGENCY)
Facility: HOSPITAL | Age: 52
LOS: 1 days | Discharge: HOME | End: 2022-10-09
Attending: EMERGENCY MEDICINE | Admitting: EMERGENCY MEDICINE
Payer: COMMERCIAL

## 2022-10-09 VITALS
DIASTOLIC BLOOD PRESSURE: 98 MMHG | HEIGHT: 67 IN | HEART RATE: 99 BPM | TEMPERATURE: 98 F | WEIGHT: 171.96 LBS | OXYGEN SATURATION: 94 % | SYSTOLIC BLOOD PRESSURE: 125 MMHG | RESPIRATION RATE: 18 BRPM

## 2022-10-09 VITALS
DIASTOLIC BLOOD PRESSURE: 82 MMHG | HEART RATE: 88 BPM | OXYGEN SATURATION: 95 % | TEMPERATURE: 98 F | RESPIRATION RATE: 18 BRPM | SYSTOLIC BLOOD PRESSURE: 132 MMHG

## 2022-10-09 DIAGNOSIS — Z98.891 HISTORY OF UTERINE SCAR FROM PREVIOUS SURGERY: Chronic | ICD-10-CM

## 2022-10-09 DIAGNOSIS — Z90.89 ACQUIRED ABSENCE OF OTHER ORGANS: Chronic | ICD-10-CM

## 2022-10-09 DIAGNOSIS — Z98.890 OTHER SPECIFIED POSTPROCEDURAL STATES: Chronic | ICD-10-CM

## 2022-10-09 LAB
ANION GAP SERPL CALC-SCNC: 12 MMOL/L — SIGNIFICANT CHANGE UP (ref 5–17)
BASOPHILS # BLD AUTO: 0.08 K/UL — SIGNIFICANT CHANGE UP (ref 0–0.2)
BASOPHILS NFR BLD AUTO: 0.8 % — SIGNIFICANT CHANGE UP (ref 0–2)
BUN SERPL-MCNC: 17 MG/DL — SIGNIFICANT CHANGE UP (ref 7–23)
CALCIUM SERPL-MCNC: 8.9 MG/DL — SIGNIFICANT CHANGE UP (ref 8.4–10.5)
CHLORIDE SERPL-SCNC: 104 MMOL/L — SIGNIFICANT CHANGE UP (ref 96–108)
CO2 SERPL-SCNC: 25 MMOL/L — SIGNIFICANT CHANGE UP (ref 22–31)
CREAT SERPL-MCNC: 0.73 MG/DL — SIGNIFICANT CHANGE UP (ref 0.5–1.3)
EGFR: 101 ML/MIN/1.73M2 — SIGNIFICANT CHANGE UP
EOSINOPHIL # BLD AUTO: 0.36 K/UL — SIGNIFICANT CHANGE UP (ref 0–0.5)
EOSINOPHIL NFR BLD AUTO: 3.6 % — SIGNIFICANT CHANGE UP (ref 0–6)
GLUCOSE SERPL-MCNC: 103 MG/DL — HIGH (ref 70–99)
HCT VFR BLD CALC: 35.4 % — SIGNIFICANT CHANGE UP (ref 34.5–45)
HGB BLD-MCNC: 11 G/DL — LOW (ref 11.5–15.5)
IMM GRANULOCYTES NFR BLD AUTO: 0.4 % — SIGNIFICANT CHANGE UP (ref 0–0.9)
LYMPHOCYTES # BLD AUTO: 1.12 K/UL — SIGNIFICANT CHANGE UP (ref 1–3.3)
LYMPHOCYTES # BLD AUTO: 11.3 % — LOW (ref 13–44)
MCHC RBC-ENTMCNC: 25.1 PG — LOW (ref 27–34)
MCHC RBC-ENTMCNC: 31.1 GM/DL — LOW (ref 32–36)
MCV RBC AUTO: 80.8 FL — SIGNIFICANT CHANGE UP (ref 80–100)
MONOCYTES # BLD AUTO: 0.8 K/UL — SIGNIFICANT CHANGE UP (ref 0–0.9)
MONOCYTES NFR BLD AUTO: 8.1 % — SIGNIFICANT CHANGE UP (ref 2–14)
NEUTROPHILS # BLD AUTO: 7.49 K/UL — HIGH (ref 1.8–7.4)
NEUTROPHILS NFR BLD AUTO: 75.8 % — SIGNIFICANT CHANGE UP (ref 43–77)
NRBC # BLD: 0 /100 WBCS — SIGNIFICANT CHANGE UP (ref 0–0)
PLATELET # BLD AUTO: 235 K/UL — SIGNIFICANT CHANGE UP (ref 150–400)
POTASSIUM SERPL-MCNC: 4 MMOL/L — SIGNIFICANT CHANGE UP (ref 3.5–5.3)
POTASSIUM SERPL-SCNC: 4 MMOL/L — SIGNIFICANT CHANGE UP (ref 3.5–5.3)
RBC # BLD: 4.38 M/UL — SIGNIFICANT CHANGE UP (ref 3.8–5.2)
RBC # FLD: 15.7 % — HIGH (ref 10.3–14.5)
SODIUM SERPL-SCNC: 141 MMOL/L — SIGNIFICANT CHANGE UP (ref 135–145)
WBC # BLD: 9.89 K/UL — SIGNIFICANT CHANGE UP (ref 3.8–10.5)
WBC # FLD AUTO: 9.89 K/UL — SIGNIFICANT CHANGE UP (ref 3.8–10.5)

## 2022-10-09 PROCEDURE — 71045 X-RAY EXAM CHEST 1 VIEW: CPT | Mod: 26

## 2022-10-09 PROCEDURE — 99283 EMERGENCY DEPT VISIT LOW MDM: CPT

## 2022-10-09 PROCEDURE — 99284 EMERGENCY DEPT VISIT MOD MDM: CPT | Mod: 25

## 2022-10-09 PROCEDURE — 71045 X-RAY EXAM CHEST 1 VIEW: CPT

## 2022-10-09 PROCEDURE — 36415 COLL VENOUS BLD VENIPUNCTURE: CPT

## 2022-10-09 PROCEDURE — 80048 BASIC METABOLIC PNL TOTAL CA: CPT

## 2022-10-09 PROCEDURE — 85025 COMPLETE CBC W/AUTO DIFF WBC: CPT

## 2022-10-09 NOTE — ED PROVIDER NOTE - OBJECTIVE STATEMENT
history of thyroid cancer s/p resection on synthroid, here with sob last night, + covid test at home. Notes uri symptoms past few days, tested positive at home. Vaccinated x3. No fever, cough. Reports she has home o2 monitor and it read from low mid 90's to 88%. Denies sick contacts. Currently shortness of breath improved, feels ok.

## 2022-10-09 NOTE — ED PROVIDER NOTE - NSFOLLOWUPINSTRUCTIONS_ED_ALL_ED_FT
Take tylenol 2 pills every 6 hours as needed for pain/fever. Please see discharge instructions on COVID19, self quarantine for 5 days from symptom onset and wear a mask for 5 more days.  Please return to the ER if symptoms worsen, significant trouble breathing, oxygen saturation persistently less than 90% on home pulse ox meter, or other concerns.

## 2022-10-09 NOTE — ED PROVIDER NOTE - PATIENT PORTAL LINK FT
You can access the FollowMyHealth Patient Portal offered by [facility] by registering at the following website: [email or web address]. By joining [employer]’s FollowMyHealth portal, you will also be able to view your health information using other applications (apps) compatible with our system.

## 2022-10-09 NOTE — ED ADULT NURSE NOTE - TEMPLATE LIST FOR HEAD TO TOE ASSESSMENT
[address] [facility] [address] [place] [address] PRIMARY CARE  [address] [place] [id number]  Dept: [phone]  Dept Fax: : [phone]   Chief Complaint  Chief Complaint   Patient presents with    Insect Bite     stung by yellow jackets, left pinky        HPI:  50 y. o.female who presents for insect bite on hand:    Insect bite on L pinky 40min ago while walking dog; thinks he walked over a ground yellow jackets. While at the vet gave dog steroid and encouraged her to see doctor. Very painful. No trouble breathing or swelling of the face/mouth.     Past Medical History:   Diagnosis Date    Cancer [facility])     thyroid    Chicken pox     Hemorrhoids     History of blood transfusion     Hyperlipidemia     Hypothyroidism     Osteoarthritis     knees, feet & back    Receives feedings through gastrostomy ([address]     Tachycardia 2019    Thyroid cancer [address] [address]      Past Surgical History:   Procedure Laterality Date     SECTION      CHOLECYSTECTOMY      GASTRIC BYPASS SURGERY  2013    GASTROSTOMY TUBE PLACEMENT      J-tube    HYSTERECTOMY      THYROID SURGERY      THYROIDECTOMY      UPPER GASTROINTESTINAL ENDOSCOPY  10/17/14     Social History     Socioeconomic History    Marital status:      Spouse name: Not on file    Number of children: Not on file    Years of education: Not on file    Highest education level: Not on file   Occupational History    Not on file   Social Needs    Financial resource strain: Not on file    Food insecurity     Worry: Not on file     Inability: Not on file   [employer] needs     Medical: Not on file     Non-medical: Not on file   Tobacco Use    Smoking status: Never Smoker    Smokeless tobacco: Never Used   Substance and Sexual Activity    Alcohol use: No    Drug use: No    Sexual activity: Never   Lifestyle    Physical activity     Days per week: Not on file Minutes per session: Not on file    Stress: Not on file   Relationships    Social connections     Talks on phone: Not on file     Gets together: Not on file     Attends Buddhist service: Not on file     Active member of club or organization: Not on file     Attends meetings of clubs or organizations: Not on file     Relationship status: Not on file    Intimate partner violence     Fear of current or ex partner: Not on file     Emotionally abused: Not on file     Physically abused: Not on file     Forced sexual activity: Not on file   Other Topics Concern    Not on file   Social History Narrative    Not on file     Allergies   Allergen Reactions    Benadryl [Diphenhydramine] Other (See Comments)     seizure    Morphine Nausea And Vomiting     Current Outpatient Medications   Medication Sig Dispense Refill    predniSONE (DELTASONE) 20 MG tablet Take 3 tablets by mouth daily for 5 days 15 tablet 0    triamcinolone (KENALOG) 0.1 % cream Apply thin layer topically 2 times daily for max of 2 weeks. 80 g 0    metoprolol tartrate (LOPRESSOR) 25 MG tablet Take 1 tablet by mouth 2 times daily 60 tablet 3    ondansetron (ZOFRAN) 4 MG tablet Take 1 tablet by mouth every 8 hours as needed for Nausea or Vomiting 90 tablet 0    methadone (DOLOPHINE) 5 MG tablet Take 1 tablet by mouth three times daily for 30 days. 90 tablet 0    ondansetron (ZOFRAN ODT) 4 MG disintegrating tablet Take 1 tablet by mouth every 8 hours as needed for Nausea or Vomiting 90 tablet 2    oxyCODONE-acetaminophen (PERCOCET) 5-325 MG per tablet Take 1 tablet by mouth every 6 hours as needed for Pain for up to 30 days. Intended supply: 30 days.  Take lowest dose possible to manage pain 120 tablet 0    Docusate Sodium (RA COL-RITE PO) Take 100 capsules by mouth daily      traZODone (DESYREL) 100 MG tablet Take 100 mg by mouth daily      senna (SENOKOT) 8.6 MG tablet Take 1 tablet by mouth 2 times daily 60 tablet 11    docusate sodium (COLACE) 100 MG capsule Take 1 capsule by mouth 2 times daily 60 capsule 1    CARTIA  MG extended release capsule take 1 capsule by mouth once daily 30 capsule 5    naloxone 4 MG/0.1ML LIQD nasal spray 1 spray by Nasal route as needed for Opioid Reversal 1 each 5    sucralfate (CARAFATE) 1 GM tablet Take 1 tablet by mouth 4 times daily 120 tablet 1    Misc. Devices MISC JOSE-KEY gastrostomy tube 14 Fr, 3.5cm (ref #[id number]) 1 Device 0    Misc. Devices MISC Mauri Button 14-16 diameter and 2 cm 1 Device 0    Misc. Devices MISC Mauri Button Tube 14-16 [place] with covers 1 Device 3    nystatin (MYCOSTATIN) 142861 UNIT/GM cream Apply topically 2 times daily. 1 Tube 3    nitroGLYCERIN (NITROSTAT) 0.4 MG SL tablet Place 1 tablet under the tongue every 5 minutes as needed for Chest pain up to max of 3 total doses. If no relief after 1 dose, call 911. 25 tablet 3    esomeprazole (NEXIUM) 40 MG delayed release capsule Take 1 capsule by mouth every morning (before breakfast) 30 capsule 11    levothyroxine (SYNTHROID) 137 MCG tablet Take 1 tablet by mouth Daily 30 tablet 11    LORazepam (ATIVAN) 0.5 MG tablet Take 0.5 mg every 8 hours PRN. 90 tablet 0    polyethylene glycol (MIRALAX) powder Take 17 g by mouth daily 510 g 3     Current Facility-Administered Medications   Medication Dose Route Frequency Provider Last Rate Last Dose    ketorolac (TORADOL) injection 30 mg  30 mg Intramuscular Once Cheyenne Almeida MD           ROS:  Review of Systems   Constitutional: Negative for chills and fever. HENT: Negative for rhinorrhea and sore throat. Respiratory: Negative for cough, shortness of breath and wheezing. Gastrointestinal: Negative for abdominal pain, constipation and diarrhea. Endocrine: Negative for polydipsia and polyuria. Genitourinary: Negative for dysuria, frequency and urgency. Skin: Positive for wound. Neurological: Negative for syncope, light-headedness, numbness and headaches. Psychiatric/Behavioral: Negative for sleep disturbance. The patient is not nervous/anxious. Vitals:    09/11/20 1532   BP: 100/60   Site: Right Upper Arm   Position: Sitting   Cuff Size: Small Adult   Pulse: 71   Temp: 97.3 °F (36.3 °C)   TempSrc: Infrared   SpO2: 99%       Physical exam:  Physical Exam  Vitals signs reviewed. Constitutional:       General: She is not in acute distress. Appearance: She is well-developed. HENT:      Head: Normocephalic and atraumatic. Neck:      Musculoskeletal: Normal range of motion. Cardiovascular:      Rate and Rhythm: Normal rate. Pulmonary:      Effort: Pulmonary effort is normal. No respiratory distress. Skin:     General: Skin is warm and dry. Neurological:      Mental Status: She is alert and oriented to person, place, and time. Psychiatric:         Behavior: Behavior normal.         Assessment/Plan:  50 y.o. female here mainly for insect bite to hand:  - toradol IM in clinic for pain; ice for comfort; she has pain meds at home; if develops itching or systemic reaction provided steroid to keep at home; she says she gets seizures with antihistamines. Diagnosis Orders   1. Insect bite of left little finger, initial encounter  ketorolac (TORADOL) injection 30 mg    predniSONE (DELTASONE) 20 MG tablet        Return if symptoms worsen or fail to improve.     Kathy Gurrola MD Respiratory

## 2022-10-09 NOTE — ED ADULT NURSE NOTE - OBJECTIVE STATEMENT
Pt with pmx of thyroidectomy due to thyroid cancer, anemia, pneumonia, seasonal allergies with presented to ED with c/o of increasing sob and low o2 sat at home. Tested positive for Covid-19 on Thursday. AOX4. Patient denies chest pain, discomfort and any form of distress not noted. Patient oriented to ED area. All needs attended. Pt on cardiac monitor. Rounding in progress. Fall risk precautions maintained.

## 2022-10-09 NOTE — ED PROVIDER NOTE - NSICDXPASTSURGICALHX_GEN_ALL_CORE_FT
PAST SURGICAL HISTORY:  H/O:      History of nasal surgery For Allergic Rhinitis - 30 years ago    History of tonsillectomy

## 2022-10-09 NOTE — ED PROVIDER NOTE - CLINICAL SUMMARY MEDICAL DECISION MAKING FREE TEXT BOX
patient with URI/viral syndrome symptoms clinically.  otherwise non-toxic and well-appearing in no respiratory distress.  lungs clear, oxygen saturation wnl on room air in ED> cxr done showing no focal infiltrate.  labs with normal wbc. reports history of anemia, hgb here 11. patient has been given education on coronavirus and testing education.  patient has been advised to self-quarantine for 5 days and given return precautions for any concerning or worsening of their symptoms

## 2022-10-11 DIAGNOSIS — U07.1 COVID-19: ICD-10-CM

## 2022-10-11 DIAGNOSIS — E55.9 VITAMIN D DEFICIENCY, UNSPECIFIED: ICD-10-CM

## 2022-10-11 DIAGNOSIS — J30.2 OTHER SEASONAL ALLERGIC RHINITIS: ICD-10-CM

## 2022-10-11 DIAGNOSIS — R06.02 SHORTNESS OF BREATH: ICD-10-CM

## 2022-10-11 DIAGNOSIS — Z85.850 PERSONAL HISTORY OF MALIGNANT NEOPLASM OF THYROID: ICD-10-CM

## 2022-10-11 DIAGNOSIS — Z87.59 PERSONAL HISTORY OF OTHER COMPLICATIONS OF PREGNANCY, CHILDBIRTH AND THE PUERPERIUM: ICD-10-CM

## 2022-11-04 ENCOUNTER — LABORATORY RESULT (OUTPATIENT)
Age: 52
End: 2022-11-04

## 2022-11-04 ENCOUNTER — APPOINTMENT (OUTPATIENT)
Dept: OTOLARYNGOLOGY | Facility: CLINIC | Age: 52
End: 2022-11-04
Payer: COMMERCIAL

## 2022-11-04 VITALS
SYSTOLIC BLOOD PRESSURE: 117 MMHG | OXYGEN SATURATION: 97 % | BODY MASS INDEX: 40.73 KG/M2 | TEMPERATURE: 98 F | WEIGHT: 176 LBS | HEIGHT: 55 IN | DIASTOLIC BLOOD PRESSURE: 77 MMHG | HEART RATE: 75 BPM

## 2022-11-04 PROCEDURE — 31575 DIAGNOSTIC LARYNGOSCOPY: CPT

## 2022-11-04 PROCEDURE — 99215 OFFICE O/P EST HI 40 MIN: CPT | Mod: 25

## 2022-11-04 NOTE — REASON FOR VISIT
[FreeTextEntry2] : a f/u visit after total thyroidectomy and parathyroidectomy for papillary thyroid carcinoma. [FreeTextEntry1] : Referred by Zoe Monson MD Endocrine, PCP is Marquita Canada MD

## 2022-11-04 NOTE — CONSULT LETTER
[Dear  ___] : Dear  [unfilled], [Consult Letter:] : I had the pleasure of evaluating your patient, [unfilled]. [Please see my note below.] : Please see my note below. [Consult Closing:] : Thank you very much for allowing me to participate in the care of this patient.  If you have any questions, please do not hesitate to contact me. [Sincerely,] : Sincerely, [DrTracie  ___] : Dr. ZAPIEN [FreeTextEntry3] : \par Elvis Valenzuela M.D., FACS, ECNU\par Director Center for Thyroid & Parathyroid Surgery at [facility]\par [facility] [place]\par Certified in Thyroid/Parathyroid/Neck Ultrasound, ECNU/ AIUM\par , Department of Otolaryngology\par [facility] at [facility]\par

## 2022-11-04 NOTE — HISTORY OF PRESENT ILLNESS
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  In March 2021 her calcium was 10.8 mg/dl, with iPTH of 137 pg/ml. Last month her calcium bumped up to 11.6 mg/dl. Her eGFR is normal at 95U/L.  A 24-hour urine collection for calcium was obtained this month and elevated to 455 mg/24h. Kary denies calcium supplements, HCTZ or past use of Lithium Carbonate. She had been on vitamin D3 1,000 IU daily but ran out recently. There is no family history of nephrolithiasis or renal disease.  There is no history of fragility bone fractures. Her DEXA scan was normal last year. Other than fatigue, memory loss, brain fog, right hip pain, she denies  muscle weakness, generalized bone aches, joint pain, depression,  nausea, vomiting, abdominal pain, constipation, polyuria/ polydipsia, nephrolithiasis, peptic ulcer disease or pancreatitis. She has a hx of mild GERD and H Pylori treated in the past.  She does have morning urinary incontinence. She was recently hypothyroid during her last pregnancy in 2013 and briefly put on thyroid hormone replacement but then recovered and is now euthyroid.  She denies recent shortness of breath, voice changes, dysphagia, anterior neck pain, neck pressure or mass. There is no family history of thyroid cancer. She has a known radiation exposure in her youth (age 16 from [place]). Vitamin D level is slightly low at 25.5 ng/mL in May.  She has not had any neck cross sectional imaging to date.  She is not aware of having a COVID-19 infection. She denies fever, body aches, cough, cyanosis, chest burning, anosmia or recent known COVID exposures.  All family members at home are well. She is fully vaccinated and boosted. \par  [FreeTextEntry1] : Kary returns for a f/u  visit after an uncomplicated total thyroidectomy and parathyroidectomy on 07/08/2022.    She denies any further paresthesias.  She is now only taking Vit D3 2K IU daily.  Repeat calcium last visit was 9.5 mg/dl, PTH nl at 24 pg/ml.  She is tolerating a reg diet.  Voice is less hoarse. Vocal fold mobility was normal post op.  Surgical pathology was favorable: 2.4 cm, unifocal, classic variant, papillary thyroid carcinoma with (minor 5%) solid component.  Angioinvasion, lymphatic invasion and extrathyroidal extension were all not identified and margins were negative for carcinoma.  All regional lymph nodes were negative for tumor for total of 7 examined.  AJCC stage I, pT1b, pN0, pMx.  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  There was no evidence of malignancy.  A right lingual tonsil biopsy for asymmetry was negative for malignancy with only benign lymphoid tissue present.  She feels well and is tolerating LT4 175 mcgs daily.  TSH was high at 26.5 and she has now been taking 175 mcgs daily.  She was presented at the Endocrine tumor board tomorrow and decision was to trend Tg levels and consider a thyrogen stimulated Tg level soon.  She also had a 0.3 cm nonspecific nodule in the right lung apex and f/u CT next June suggested.  She has been to Dr. Dowd in the past and will f/u.  Since the surgery she has made excellent recovery and is feeling better on her higher dose of levothyroxine at 175 mcg with beginning stabilization of her weight after some weight gain.  Last month she tested positive for COVID and unclear exactly how she got it but it was a mild infection and she has recovered since.  She has been vaccinated and her last booster shot in January.

## 2022-11-04 NOTE — PROCEDURE
[Image(s) Captured] : image(s) captured and filed [Unable to Cooperate with Mirror] : patient unable to cooperate with mirror [Gag Reflex] : gag reflex preventing mirror examination [Topical Lidocaine] : topical lidocaine [Oxymetazoline HCl] : oxymetazoline HCl [Flexible Endoscope] : examined with the flexible endoscope [Serial Number: ___] : Serial Number: [unfilled] [de-identified] : The nasal septum is minimally deviated to the right with a spur. There are no masses or polyps and the nasal mucosa and secretions are normal. The choanae and posterior nasopharynx are normal without masses or drainage. The Eustachian tube orifices appear patent. The pharynx, including the posterior and lateral pharyngeal walls, the vallecula and base of tongue are normal without ulcerations, lesions or masses. The hypopharynx including the pyriform sinuses open well without pooling of secretions, mucosal lesions or masses. The supraglottic larynx including the epiglottis, petiole, arytenoids, glossoepiglottic, aryepiglottic and pharyngoepiglottic folds are normal without mucosal lesions, ulcerations or masses. The glottis reveals normal false vocal folds. The true vocal folds are glistening white, tense and of equal length, without paralysis, having symmetric mobility on adduction and abduction. There are no mucosal lesions, nodules, cysts, erythroplasia or leukoplakia. The posterior cricoid area has healthy pink mucosa in the interarytenoid area and esophageal inlet. There is very slight thickening/pachydermia of the interarytenoid mucosa suggestive of posterior laryngitis from laryngopharyngeal acid reflux disease. The trachea is clear without narrowing in the immediate subglottic region, without deviation or lesions.  [de-identified] : post evaluation s/p total thyroidectomy for papillary thyroid carcinoma.

## 2022-11-07 LAB
25(OH)D3 SERPL-MCNC: 48.3 NG/ML
T4 FREE SERPL-MCNC: 2.1 NG/DL
THYROGLOB AB SERPL-ACNC: <20 IU/ML
THYROGLOB SERPL-MCNC: <0.2 NG/ML
TSH SERPL-ACNC: 0.15 UIU/ML

## 2022-12-21 LAB
T4 FREE SERPL-MCNC: 1.8 NG/DL
TSH SERPL-ACNC: 0.4 UIU/ML

## 2023-01-09 ENCOUNTER — APPOINTMENT (OUTPATIENT)
Dept: OTOLARYNGOLOGY | Facility: CLINIC | Age: 53
End: 2023-01-09
Payer: COMMERCIAL

## 2023-01-09 VITALS
DIASTOLIC BLOOD PRESSURE: 77 MMHG | WEIGHT: 169.75 LBS | HEIGHT: 67 IN | SYSTOLIC BLOOD PRESSURE: 124 MMHG | HEART RATE: 63 BPM | TEMPERATURE: 97.9 F | OXYGEN SATURATION: 98 % | BODY MASS INDEX: 26.64 KG/M2

## 2023-01-09 PROCEDURE — 76536 US EXAM OF HEAD AND NECK: CPT

## 2023-01-09 PROCEDURE — 99215 OFFICE O/P EST HI 40 MIN: CPT | Mod: 25

## 2023-01-09 PROCEDURE — 31575 DIAGNOSTIC LARYNGOSCOPY: CPT

## 2023-01-09 NOTE — CONSULT LETTER
[Dear  ___] : Dear  [unfilled], [Consult Letter:] : I had the pleasure of evaluating your patient, [unfilled]. [Please see my note below.] : Please see my note below. [Consult Closing:] : Thank you very much for allowing me to participate in the care of this patient.  If you have any questions, please do not hesitate to contact me. [Sincerely,] : Sincerely, [DrTracie  ___] : Dr. ZAPIEN [FreeTextEntry3] : \par Elvis Valenzuela M.D., FACS, ECNU\par Director Center for Thyroid & Parathyroid Surgery at [facility]\par [facility] [place]\par Certified in Thyroid/Parathyroid/Neck Ultrasound, ECNU/ AIUM\par , Department of Otolaryngology\par [facility] at [facility]\par

## 2023-01-09 NOTE — PROCEDURE
[Image(s) Captured] : image(s) captured and filed [Unable to Cooperate with Mirror] : patient unable to cooperate with mirror [Gag Reflex] : gag reflex preventing mirror examination [Topical Lidocaine] : topical lidocaine [Oxymetazoline HCl] : oxymetazoline HCl [Flexible Endoscope] : examined with the flexible endoscope [Serial Number: ___] : Serial Number: [unfilled] [FreeTextEntry3] : [facility]\par POST THYROIDECTOMY NECK ULTRASOUND REPORT\par \par NAME: WHITNEY MORROW .Tracie...           MR# [id number] .....	              : 1970.....	         DATE: 2023\par \par HISTORY/ INDICATIONS: A 52-year-old female status post total thyroidectomy for papillary thyroid carcinoma for baseline neck imaging.  Current thyroglobulin and thyroglobulin antibodies are undetectable.  Patient did not receive radioactive iodine.\par \par COMPARISON: None.\par \par PROCEDURE: Signing physician referred and performed high-resolution ultrasound gray scale imaging and color Doppler supplementation of the thyroid gland surgical bed and neck was obtained in the longitudinal and transverse planes using a 13 MHz linear transducer with image capture.  All measurements are in centimeters (longitudinal x AP x transverse).  \par \par FINDINGS: The thyroid gland has been surgically removed and replaced by echogenic scar tissue.  There are no hypoechoic nodules, vascular lesions or calcifications in the central neck compartment. \par \par PARATHYROID GLANDS: There are no identified enlarged parathyroid glands in the central neck compartment. \par \par LYMPH NODES: Bilateral neck levels I - VI were examined.  There are several benign appearing subcentimeter lymph nodes identified at neck levels II- III bilaterally (lateral neck), all with echogenic hilar lines, no calcifications or cystic degeneration and have a short long axis ratio < 0.5 in the transverse plane.  There are no enlarged or abnormal appearing central compartment, level VI lymph nodes.\par \par IMPRESSION: A 52-year-old female s/p total thyroidectomy and central compartment lymph node dissection.  There is no current evidence for residual thyroid tissue, recurrent tumor, enlarged or morphologically abnormal suspicious cervical lymph nodes levels I - VI.  \par \par RECOMMENDATIONS: Repeat thyroid ultrasound in 6 months as well as continued monitoring of TSH and thyroglobulin levels. \par \par Electronically signed by referring, interpreting and reporting physician: Elvis Valenzuela MD on 2023, 5:10 PM.\par \par [facility]: [address] [place] [address], [address], [place]  [address]\par [phone] (voice), [phone] (fax) [de-identified] : The nasal septum is minimally deviated to the right. There are no masses or polyps and the nasal mucosa and secretions are normal. The choanae and posterior nasopharynx are normal without masses or drainage. The Eustachian tube orifices appear patent. The pharynx, including the posterior and lateral pharyngeal walls, the vallecula and base of tongue are normal without ulcerations, lesions or masses.  The lingual tonsils are hyperplastic with slight asymmetry right greater than left without discrete mass noted.  The hypopharynx including the pyriform sinuses open well without pooling of secretions, mucosal lesions or masses. The supraglottic larynx including the epiglottis, petiole, arytenoids, glossoepiglottic, aryepiglottic and pharyngoepiglottic folds are normal without mucosal lesions, ulcerations or masses. The glottis reveals normal false vocal folds. The true vocal folds are glistening white, tense and of equal length, without paralysis, having symmetric mobility on adduction and abduction. There are no mucosal lesions, nodules, cysts, erythroplasia or leukoplakia. The posterior cricoid area has healthy pink mucosa in the interarytenoid area and esophageal inlet. There is no significant thickening/pachydermia of the interarytenoid mucosa suggestive of posterior laryngitis from laryngopharyngeal acid reflux disease. The trachea is clear without narrowing in the immediate subglottic region, without deviation or lesions.  [de-identified] : followup parathyroidectomy and total thyroidectomy for PTC

## 2023-01-09 NOTE — HISTORY OF PRESENT ILLNESS
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  In March 2021 her calcium was 10.8 mg/dl, with iPTH of 137 pg/ml. Last month her calcium bumped up to 11.6 mg/dl. Her eGFR is normal at 95U/L.  A 24-hour urine collection for calcium was obtained this month and elevated to 455 mg/24h. Kary denies calcium supplements, HCTZ or past use of Lithium Carbonate. She had been on vitamin D3 1,000 IU daily but ran out recently. There is no family history of nephrolithiasis or renal disease.  There is no history of fragility bone fractures. Her DEXA scan was normal last year. Other than fatigue, memory loss, brain fog, right hip pain, she denies  muscle weakness, generalized bone aches, joint pain, depression,  nausea, vomiting, abdominal pain, constipation, polyuria/ polydipsia, nephrolithiasis, peptic ulcer disease or pancreatitis. She has a hx of mild GERD and H Pylori treated in the past.  She does have morning urinary incontinence. She was recently hypothyroid during her last pregnancy in 2013 and briefly put on thyroid hormone replacement but then recovered and is now euthyroid.  She denies recent shortness of breath, voice changes, dysphagia, anterior neck pain, neck pressure or mass. There is no family history of thyroid cancer. She has a known radiation exposure in her youth (age 16 from [place]). Vitamin D level is slightly low at 25.5 ng/mL in May.  She has not had any neck cross sectional imaging to date.  She is not aware of having a COVID-19 infection. She denies fever, body aches, cough, cyanosis, chest burning, anosmia or recent known COVID exposures.  All family members at home are well. She is fully vaccinated and boosted. \par  [FreeTextEntry1] : Kary returns for a f/u visit after an uncomplicated total thyroidectomy and parathyroidectomy on 07/08/2022.  She denies any further paresthesias.  She is now only taking Vit D3 2K IU daily.  Repeat calcium last visit was 9.5 mg/dl, PTH nl at 24 pg/ml.  She is tolerating a reg diet.  Voice is less hoarse. Vocal fold mobility was normal post op.  Surgical pathology was favorable: 2.4 cm, unifocal, classic variant, papillary thyroid carcinoma with (minor 5%) solid component.  Angioinvasion, lymphatic invasion and extrathyroidal extension were all not identified and margins were negative for carcinoma.  All regional lymph nodes were negative for tumor for total of 7 examined.  AJCC stage I, pT1b, pN0, pMx.  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  There was no evidence of malignancy.  A right lingual tonsil biopsy for asymmetry was negative for malignancy with only benign lymphoid tissue present. She feels well and is tolerating LT4 175 mcgs daily and 1/2 pill on Saturdays now.  TSH in December was 0.4, and she has now been taking 175 mcgs daily.  She was presented at the Endocrine tumor board and decision was to trend Tg levels and consider a thyrogen stimulated Tg level soon.  Ever in November her thyroglobulin and thyroglobulin antibodies were both undetectable.  She also had a 0.3 cm nonspecific nodule in the right lung apex and f/u CT next June suggested.  She has been to Dr. Dowd in the past and will f/u.  Since the surgery she has made excellent recovery and is feeling better on her higher dose of levothyroxine at 175 mcg with beginning stabilization of her weight after some weight gain.  In October she tested positive for COVID and unclear exactly how she got it but it was a mild infection and she has recovered since.  She has been vaccinated and her last booster shot last January.

## 2023-01-30 ENCOUNTER — APPOINTMENT (OUTPATIENT)
Dept: PULMONOLOGY | Facility: CLINIC | Age: 53
End: 2023-01-30
Payer: COMMERCIAL

## 2023-01-30 VITALS
DIASTOLIC BLOOD PRESSURE: 90 MMHG | HEART RATE: 76 BPM | BODY MASS INDEX: 26.64 KG/M2 | OXYGEN SATURATION: 98 % | SYSTOLIC BLOOD PRESSURE: 130 MMHG | TEMPERATURE: 97.6 F | HEIGHT: 67 IN | WEIGHT: 169.75 LBS

## 2023-01-30 DIAGNOSIS — R06.00 DYSPNEA, UNSPECIFIED: ICD-10-CM

## 2023-01-30 DIAGNOSIS — R06.89 DYSPNEA, UNSPECIFIED: ICD-10-CM

## 2023-01-30 DIAGNOSIS — J45.909 UNSPECIFIED ASTHMA, UNCOMPLICATED: ICD-10-CM

## 2023-01-30 PROCEDURE — 95012 NITRIC OXIDE EXP GAS DETER: CPT

## 2023-01-30 PROCEDURE — 99214 OFFICE O/P EST MOD 30 MIN: CPT | Mod: 25

## 2023-01-30 PROCEDURE — 94060 EVALUATION OF WHEEZING: CPT

## 2023-01-30 PROCEDURE — 94727 GAS DIL/WSHOT DETER LNG VOL: CPT

## 2023-01-30 PROCEDURE — 94729 DIFFUSING CAPACITY: CPT

## 2023-01-30 RX ORDER — BUDESONIDE AND FORMOTEROL FUMARATE DIHYDRATE 80; 4.5 UG/1; UG/1
80-4.5 AEROSOL RESPIRATORY (INHALATION) TWICE DAILY
Qty: 1 | Refills: 5 | Status: ACTIVE | COMMUNITY
Start: 2023-01-30 | End: 1900-01-01

## 2023-01-30 NOTE — HISTORY OF PRESENT ILLNESS
[TextBox_4] : 12/02/2020: Referred by ED for pneumonitis. Physician with background of allergies, rhinitis. Occasionally has what she terms an asthma attack, though never given a formal dx of asthma; this is rare. On Saturday she was walking and felt suddenly dyspneic. Able to keep walking and came home and checked sat and was 88%. Uncomfortable all night. Sunday sat was better, felt better but still dyspneic, called [employer] who told her to get tested the next day. Seen in ED testing site 11/30, was tachy and a little dyspneic but not hypoxic, was seen in ED and had CTA. Of note had started Xiidra a week earlier, a rare listed side effect is dyspnea, but her doctor has never seen that as a side effect. She remains dyspneic, orthopneic. Has cough, no fever. Never smoker, no inhalations. [place] w a cat, no feathers. May see mold in her apartment.\par \par In terms of Covid Abs, did have an episode of tiredness in April but no other significant illness this year. Her 6 year old did have a high fever and refused to walk complaining of leg pain on April 6 and had been exposed to no one but the patient and her . He recovered spontaneously in one day.  is a hospitalist and reported change in taste around the same time.\par \par In 2012 she vacationed on [place] and her cat became severely ill after mouthing a rock she collected from [place], where there are wild horses. The vets suspected Rhodococcus infection. She of course did not put any rocks in her mouth and she was not ill at that time.\par \par I gave her a course of Levaquin for a presumed CAP and asked her to have short interval follow up.\par \par 12/16/20: She feels definitely better. She is no longer waking up with dyspnea. She has not been hypoxic. She has a mild chronic cough, nothing atypical for her. She still feels fatigued and has bilateral upper back pain that is not worse with breathing, is constant and doesn't feel muscular. She completed Levaquin without adverse effects.\par \par 1/30/23: Here regarding 2 things. One is 3mm nodule in setting of papillary thyroid carcinoma. Second is low pulse of reading of 86% about 5 days into having Covid in Oct 2022, recovering at home, had been doing some cleaning and was javier. Was seen in ED next day and was ok.\par

## 2023-03-14 NOTE — ASU PREOP CHECKLIST - NS PREOP CHK TEST_COVID RESULT_GEN_ALL_CORE
HPI   Pt presents with complaints of sinus congestion and cough for 4-5 days, gradually worsening. Had fevers/chills with illness onset for first 24 hours then resolved. Taking otc allergy meds without relief. Past Medical History:   Diagnosis Date    Anemia     past, but not since hysterectomy        Past Surgical History:   Procedure Laterality Date    HX HYSTERECTOMY  11/16/2016    [place] UNLISTED PROCEDURE ABDOMEN PERITONEUM & OMENTUM      tumm[place]         Family History   Problem Relation Age of Onset    No Known Problems Mother     No Known Problems Father     Ovarian Cancer Sister     No Known Problems Brother     Heart Failure Maternal Grandmother     No Known Problems Maternal Grandfather     No Known Problems Sister     No Known Problems Brother         Social History     Socioeconomic History    Marital status: SINGLE     Spouse name: Not on file    Number of children: Not on file    Years of education: Not on file    Highest education level: Not on file   Occupational History    Not on file   Tobacco Use    Smoking status: Never    Smokeless tobacco: Never   Substance and Sexual Activity    Alcohol use: Yes     Comment: occassionally--maybe once or twice per yr. Drug use: No    Sexual activity: Yes     Partners: Male     Comment:  has 3 children ages 6 yrs, 9rs. & 6 yrs. Other Topics Concern    Not on file   Social History Narrative    Not on file     Social Determinants of Health     Financial Resource Strain: Not on file   Food Insecurity: Not on file   Transportation Needs: Not on file   Physical Activity: Not on file   Stress: Not on file   Social Connections: Not on file   Intimate Partner Violence: Not on file   Housing Stability: Not on file                ALLERGIES: Patient has no known allergies. Review of Systems   Constitutional:  Positive for fatigue. Negative for chills and fever. HENT:  Positive for congestion and sinus pressure. Negative for ear pain and sore throat. Respiratory:  Positive for cough. Negative for chest tightness, shortness of breath and wheezing. Cardiovascular:  Negative for chest pain. Gastrointestinal:  Negative for abdominal pain, diarrhea, nausea and vomiting. Musculoskeletal:  Negative for myalgias. Vitals:    03/13/23 2013   BP: (!) 128/94   Pulse: 67   Resp: 18   Temp: 98.4 °F (36.9 °C)   SpO2: 99%       Physical Exam  Constitutional:       General: She is not in acute distress. Appearance: Normal appearance. She is well-developed. She is not ill-appearing or toxic-appearing. HENT:      Head: Normocephalic and atraumatic. Right Ear: Tympanic membrane, ear canal and external ear normal.      Left Ear: Tympanic membrane, ear canal and external ear normal.      Nose: Congestion present. Mouth/Throat:      Mouth: Mucous membranes are moist.      Pharynx: Oropharynx is clear. Eyes:      Extraocular Movements: Extraocular movements intact. Conjunctiva/sclera: Conjunctivae normal.      Pupils: Pupils are equal, round, and reactive to light. Cardiovascular:      Rate and Rhythm: Normal rate and regular rhythm. Heart sounds: Normal heart sounds. Pulmonary:      Effort: Pulmonary effort is normal.      Breath sounds: Normal breath sounds. Musculoskeletal:      Cervical back: Normal range of motion and neck supple. Lymphadenopathy:      Cervical: No cervical adenopathy. Skin:     General: Skin is warm and dry. Neurological:      General: No focal deficit present. Mental Status: She is alert and oriented to person, place, and time. ICD-10-CM ICD-9-CM   1. Acute non-recurrent maxillary sinusitis  J01.00 461.0       Orders Placed This Encounter    doxycycline (MONODOX) 100 mg capsule     Sig: Take 1 Capsule by mouth two (2) times a day for 7 days. Dispense:  14 Capsule     Refill:  0        The patient is to follow up with PCP [facility]. If signs and symptoms become worse the pt is to go to the ER. Tracy Ely, MINOO         MDM    Procedures Negative

## 2023-03-29 ENCOUNTER — APPOINTMENT (OUTPATIENT)
Dept: ENDOCRINOLOGY | Facility: CLINIC | Age: 53
End: 2023-03-29
Payer: COMMERCIAL

## 2023-03-29 VITALS
WEIGHT: 162 LBS | BODY MASS INDEX: 25.43 KG/M2 | DIASTOLIC BLOOD PRESSURE: 78 MMHG | HEIGHT: 67 IN | SYSTOLIC BLOOD PRESSURE: 121 MMHG | HEART RATE: 62 BPM

## 2023-03-29 PROCEDURE — 99213 OFFICE O/P EST LOW 20 MIN: CPT

## 2023-03-29 NOTE — ASSESSMENT
[FreeTextEntry1] : Surgical hypothyroidism.  Papillary thyroid cancer, likely cured with surgery\par Agree with reducing levothyroxine dose; explained that TSH below 0.50 can contribute to bone loss and cause palpitations and other hyperthyroid symptoms.  Goal TSH range 0.5-2.5.\par Discussed factors that can affect thyroxine dosing such as significant weight changes, use of PPI,  estrogen status.\par \par Adrenal adenoma.\par Will check ACTH, DHEAS, cortisol periodically (every 1-2 years) to see if she has MACS (mid autonomous cortisol secretion)\par \par RTO 1 year

## 2023-03-29 NOTE — PHYSICAL EXAM
[Alert] : alert [Healthy Appearance] : healthy appearance [No Acute Distress] : no acute distress [No Proptosis] : no proptosis [No Lid Lag] : no lid lag [Normal Hearing] : hearing was normal [Well Healed Scar] : well healed scar [Clear to Auscultation] : lungs were clear to auscultation bilaterally [Normal S1, S2] : normal S1 and S2 [Regular Rhythm] : with a regular rhythm [No Stigmata of Cushings Syndrome] : no stigmata of Cushings Syndrome [Normal Affect] : the affect was normal [Normal Mood] : the mood was normal [Acanthosis Nigricans] : no acanthosis nigricans [de-identified] : thyroid not palpable [de-identified] : no plethora, moon facies, dorsal or supraclavicular fat pads

## 2023-03-29 NOTE — HISTORY OF PRESENT ILLNESS
[FreeTextEntry1] : Since our last visit, she underwent parathyroidectomy and total thyroidectomy\par Dose of levothyroxine was reduced by half tab per week because TSH was very low in January\par She was having some palpitations and now they have resolved.\par Her weight is down  about 15 lb in the past 6 months, which is intentional.  She is trying to get back to her pre pregnancy weight (around 140) and is watching her diet more carefully.    She substituted whole milk in her coffee for oat milk.\par She walks 8000 steps/day during the week and 20K steps on weekend.\par Periods are regular.  They had stopped for 2 months after surgery, when levothyroxine dose was being adjusted, but now they are regular.  no menopause symptoms.  Her sister was 49 when she had menopause.\par She takes levothyroxine first thing in the morning and then waits 30 min before  having her coffee\par \par PMH:  primary hyperpara\par L adrenal adenoma 1.5cm\par \par Meds\par levothyroxine 175mcg, 6.5 tab per week\par supplements:  vitamin D 1000 IU/day, turmeric, lipoic acid, fucoidan, piperine [FRANK] : patient was not treated with radioactive iodine [de-identified] : 7/2022 [de-identified] : total thyroidectomy [de-identified] : papillary, classic variant, 1.4cm on R side.  no lymph or angioinvasion, no extrathyroid extension, margins negative;  parathyroid adenoma 1.3cm in R lower pole

## 2023-03-29 NOTE — DATA REVIEWED
[FreeTextEntry1] : 12/22  TSH 0.40\par 11/22  TSH 0.15, Tg < 0.20, Tg Ab < 20 \par 9/22  TSH 20.70, Tg < 0.20, Tg Ab < 20, Ca 8.5, PTH 25\par 8/22  TSH 26.50, Ca 9.5, PTH 24\par 6/22  TSH 3.77\par 3/21: A1c 5.5%, tot chol 192, trig 54, HDL 89, LDL 93, Ca 10.8, , 25D 27.3\par 1/21: Ca 11.0, , 25D 27.9, 1,25D 94.2.  normal SPEP. lelo/renin 11.3/3.2, normal met/normet.  TPO 15.2, Tg Ab < 20, TSH 3.32\par 12/20: Ca 11.3, K 4.3, ferritin 13, iron 22, 25D 25.8, TSH 5.47\par 11/20: Ca 11.2, A1c 5.5%, K 4.2, tot protin 7.3\par \par bone density 3/21: no T scores reported?   normal Z scores\par 1/3 radius\par \par CT, PE protocol, 11/20:\par L adrenal adenoma, appearing benign (with fat). 1.5cm

## 2023-03-30 LAB
ACTH SER-ACNC: 15.7 PG/ML
ALBUMIN SERPL ELPH-MCNC: 4.6 G/DL
ALP BLD-CCNC: 41 U/L
ALT SERPL-CCNC: 16 U/L
ANION GAP SERPL CALC-SCNC: 13 MMOL/L
AST SERPL-CCNC: 14 U/L
BILIRUB SERPL-MCNC: 0.3 MG/DL
BUN SERPL-MCNC: 15 MG/DL
CALCIUM SERPL-MCNC: 9.2 MG/DL
CHLORIDE SERPL-SCNC: 104 MMOL/L
CO2 SERPL-SCNC: 23 MMOL/L
CORTIS SERPL-MCNC: 4.5 UG/DL
CREAT SERPL-MCNC: 0.69 MG/DL
DHEA-S SERPL-MCNC: 139 UG/DL
EGFR: 106 ML/MIN/1.73M2
GLUCOSE SERPL-MCNC: 89 MG/DL
POTASSIUM SERPL-SCNC: 4.4 MMOL/L
PROT SERPL-MCNC: 6.6 G/DL
SODIUM SERPL-SCNC: 140 MMOL/L

## 2023-04-03 ENCOUNTER — OUTPATIENT (OUTPATIENT)
Dept: OUTPATIENT SERVICES | Facility: HOSPITAL | Age: 53
LOS: 1 days | End: 2023-04-03
Payer: COMMERCIAL

## 2023-04-03 ENCOUNTER — APPOINTMENT (OUTPATIENT)
Dept: MAMMOGRAPHY | Facility: HOSPITAL | Age: 53
End: 2023-04-03

## 2023-04-03 ENCOUNTER — APPOINTMENT (OUTPATIENT)
Dept: ULTRASOUND IMAGING | Facility: HOSPITAL | Age: 53
End: 2023-04-03

## 2023-04-03 DIAGNOSIS — Z98.891 HISTORY OF UTERINE SCAR FROM PREVIOUS SURGERY: Chronic | ICD-10-CM

## 2023-04-03 DIAGNOSIS — Z90.89 ACQUIRED ABSENCE OF OTHER ORGANS: Chronic | ICD-10-CM

## 2023-04-03 DIAGNOSIS — Z98.890 OTHER SPECIFIED POSTPROCEDURAL STATES: Chronic | ICD-10-CM

## 2023-04-03 PROCEDURE — 77066 DX MAMMO INCL CAD BI: CPT

## 2023-04-03 PROCEDURE — 76641 ULTRASOUND BREAST COMPLETE: CPT

## 2023-04-03 PROCEDURE — G0279: CPT | Mod: 26

## 2023-04-03 PROCEDURE — G0279: CPT

## 2023-04-03 PROCEDURE — 76641 ULTRASOUND BREAST COMPLETE: CPT | Mod: 26,50

## 2023-04-03 PROCEDURE — 77066 DX MAMMO INCL CAD BI: CPT | Mod: 26

## 2023-04-17 ENCOUNTER — APPOINTMENT (OUTPATIENT)
Dept: MRI IMAGING | Facility: HOSPITAL | Age: 53
End: 2023-04-17
Payer: COMMERCIAL

## 2023-04-17 ENCOUNTER — OUTPATIENT (OUTPATIENT)
Dept: OUTPATIENT SERVICES | Facility: HOSPITAL | Age: 53
LOS: 1 days | End: 2023-04-17
Payer: COMMERCIAL

## 2023-04-17 DIAGNOSIS — Z90.89 ACQUIRED ABSENCE OF OTHER ORGANS: Chronic | ICD-10-CM

## 2023-04-17 DIAGNOSIS — Z98.891 HISTORY OF UTERINE SCAR FROM PREVIOUS SURGERY: Chronic | ICD-10-CM

## 2023-04-17 DIAGNOSIS — Z98.890 OTHER SPECIFIED POSTPROCEDURAL STATES: Chronic | ICD-10-CM

## 2023-04-17 PROCEDURE — C8908: CPT

## 2023-04-17 PROCEDURE — 77049 MRI BREAST C-+ W/CAD BI: CPT | Mod: 26

## 2023-04-17 PROCEDURE — A9585: CPT

## 2023-04-17 PROCEDURE — C8937: CPT

## 2023-04-28 ENCOUNTER — APPOINTMENT (OUTPATIENT)
Dept: ULTRASOUND IMAGING | Facility: CLINIC | Age: 53
End: 2023-04-28
Payer: COMMERCIAL

## 2023-04-28 PROCEDURE — 76856 US EXAM PELVIC COMPLETE: CPT

## 2023-04-28 PROCEDURE — 76830 TRANSVAGINAL US NON-OB: CPT

## 2023-06-12 ENCOUNTER — APPOINTMENT (OUTPATIENT)
Dept: OTOLARYNGOLOGY | Facility: CLINIC | Age: 53
End: 2023-06-12
Payer: COMMERCIAL

## 2023-06-12 VITALS
HEART RATE: 80 BPM | OXYGEN SATURATION: 97 % | BODY MASS INDEX: 25.43 KG/M2 | WEIGHT: 162 LBS | TEMPERATURE: 98 F | HEIGHT: 67 IN | SYSTOLIC BLOOD PRESSURE: 115 MMHG | DIASTOLIC BLOOD PRESSURE: 78 MMHG

## 2023-06-12 DIAGNOSIS — J06.9 ACUTE UPPER RESPIRATORY INFECTION, UNSPECIFIED: ICD-10-CM

## 2023-06-12 PROCEDURE — 31575 DIAGNOSTIC LARYNGOSCOPY: CPT

## 2023-06-12 PROCEDURE — 76536 US EXAM OF HEAD AND NECK: CPT

## 2023-06-12 PROCEDURE — 99215 OFFICE O/P EST HI 40 MIN: CPT | Mod: 25

## 2023-06-12 NOTE — DATA REVIEWED
[de-identified] : see HPI  [de-identified] : see HPI  [de-identified] : surgical path reviewed.

## 2023-06-12 NOTE — HISTORY OF PRESENT ILLNESS
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  In March 2021 her calcium was 10.8 mg/dl, with iPTH of 137 pg/ml. Last month her calcium bumped up to 11.6 mg/dl. Her eGFR is normal at 95U/L.  A 24-hour urine collection for calcium was obtained this month and elevated to 455 mg/24h. Kary denies calcium supplements, HCTZ or past use of Lithium Carbonate. She had been on vitamin D3 1,000 IU daily but ran out recently. There is no family history of nephrolithiasis or renal disease.  There is no history of fragility bone fractures. Her DEXA scan was normal last year. Other than fatigue, memory loss, brain fog, right hip pain, she denies  muscle weakness, generalized bone aches, joint pain, depression,  nausea, vomiting, abdominal pain, constipation, polyuria/ polydipsia, nephrolithiasis, peptic ulcer disease or pancreatitis. She has a hx of mild GERD and H Pylori treated in the past.  She does have morning urinary incontinence. She was recently hypothyroid during her last pregnancy in 2013 and briefly put on thyroid hormone replacement but then recovered and is now euthyroid.  She denies recent shortness of breath, voice changes, dysphagia, anterior neck pain, neck pressure or mass. There is no family history of thyroid cancer. She has a known radiation exposure in her youth (age 16 from [place]). Vitamin D level is slightly low at 25.5 ng/mL in May.  She has not had any neck cross sectional imaging to date.  She is not aware of having a COVID-19 infection. She denies fever, body aches, cough, cyanosis, chest burning, anosmia or recent known COVID exposures.  All family members at home are well. She is fully vaccinated and boosted. \par  [FreeTextEntry1] : Kary returns for a f/u visit after an uncomplicated total thyroidectomy and parathyroidectomy on 07/08/2022.  She denies any further paresthesias.  She is now only taking Vit D3 2K IU daily.  Post op calcium was 9.5 mg/dl, PTH nl at 24 pg/ml.  She is tolerating a reg diet.  Voice is less hoarse. Vocal fold mobility was normal post op.  Surgical pathology was favorable: 2.4 cm, unifocal, classic variant, papillary thyroid carcinoma with (minor 5%) solid component.  Angioinvasion, lymphatic invasion and extrathyroidal extension were all not identified and margins were negative for carcinoma.  All regional lymph nodes were negative for tumor for total of 7 examined.  AJCC stage I, pT1b, pN0, pMx.  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  There was no evidence of malignancy.  A right lingual tonsil biopsy for asymmetry was negative for malignancy with only benign lymphoid tissue present. She feels well and is tolerating LT4 175 mcgs daily and 1/2 pill on Saturdays now.  TSH in December 2022 was 0.4.   She was presented at the Endocrine tumor board and decision was to trend Tg levels and consider a thyrogen stimulated Tg depending on the baseline Tg levels on hormone suppression. In November her thyroglobulin and thyroglobulin antibodies were both undetectable.  She also had a 0.3 cm nonspecific nodule in the right lung apex. CT chest 11/2020 and 12/2020 [facility], and neck CT ERMI 6/2022 all reviewed by Dr. Dowd : the report of the neck CT notes a 3 mm ground glass nodule at the R apex; and actually 2 tiny GGOs in the RUL and in hindsight both are present in 12/2020 as well.  She is being monitored by her pulmonologist for this.  She has mild asthma and was using  Albuterol PRN. Since the surgery she has made excellent recovery and is feeling better on current lower dose of levothyroxine.  Her weight is now stable after some weight gain.  Last October she tested positive for COVID and unclear exactly how she got it but it was a mild infection and she has recovered since.  She has been vaccinated and boosted. Two weeks ago she had a URI, with an initial productive cough but now dry x 1 week.  She is also hoarse but has not had a fever or chills.  She had a COVID antigen nasal test that was negative. Her  had a URI just prior.

## 2023-06-12 NOTE — CONSULT LETTER
[Dear  ___] : Dear  [unfilled], [Consult Letter:] : I had the pleasure of evaluating your patient, [unfilled]. [Please see my note below.] : Please see my note below. [Consult Closing:] : Thank you very much for allowing me to participate in the care of this patient.  If you have any questions, please do not hesitate to contact me. [Sincerely,] : Sincerely, [DrTracie  ___] : Dr. ZAPIEN [FreeTextEntry3] : \par Elvis Valenzuela M.D., FACS, ECNU\par Director [facility] at [facility]\par [facility]\par Certified in Thyroid/Parathyroid/Neck Ultrasound, [facility]/ [facility]\par , Department of Otolaryngology\par [employer] [facility]\par

## 2023-06-12 NOTE — PROCEDURE
[Image(s) Captured] : image(s) captured and filed [Unable to Cooperate with Mirror] : patient unable to cooperate with mirror [Gag Reflex] : gag reflex preventing mirror examination [Topical Lidocaine] : topical lidocaine [Oxymetazoline HCl] : oxymetazoline HCl [Flexible Endoscope] : examined with the flexible endoscope [Serial Number: ___] : Serial Number: [unfilled] [Hoarseness] : hoarseness not clearly evaluated by indirect laryngoscopy [FreeTextEntry3] : [facility]\par POST THYROIDECTOMY NECK ULTRASOUND REPORT\par \par NAME: WHITNEY MORROW...           MR# [id number] .....	              : 1970.....	         DATE: 2023\par \par HISTORY/ INDICATIONS: A 52-year-old female status post total thyroidectomy for papillary thyroid carcinoma for follow-up neck imaging.  Current thyroglobulin and thyroglobulin antibodies are undetectable.  Patient did not receive radioactive iodine.\par \par COMPARISON: Office study dated 2023.\par \par PROCEDURE: Signing physician referred and performed high-resolution ultrasound gray scale imaging and color Doppler supplementation of the thyroid gland surgical bed and neck was obtained in the longitudinal and transverse planes using a 13 MHz linear transducer with image capture.  All measurements are in centimeters (longitudinal x AP x transverse).  \par \par FINDINGS: The thyroid gland has been surgically removed and replaced by echogenic scar tissue.  There are no hypoechoic nodules, vascular lesions or calcifications in the central neck compartment. \par \par PARATHYROID GLANDS: There are no identified enlarged parathyroid glands in the central neck compartment. \par \par LYMPH NODES: Bilateral neck levels I - VI were examined.  There are several benign appearing subcentimeter lymph nodes identified at neck levels II- III bilaterally (lateral neck), all with echogenic hilar lines, no calcifications or cystic degeneration and have a short long axis ratio < 0.5 in the transverse plane.  There are no enlarged or abnormal appearing central compartment, level VI lymph nodes.\par \par IMPRESSION: A 52-year-old female s/p total thyroidectomy and central compartment lymph node dissection.  There is no current evidence for residual thyroid tissue, recurrent tumor, enlarged or morphologically abnormal suspicious cervical lymph nodes levels I - VI.  \par \par RECOMMENDATIONS: Repeat thyroid ultrasound in 12 months as well as continued monitoring of TSH and thyroglobulin levels. \par \par Electronically signed by referring, interpreting and reporting physician: Elvis Valenzuela MD on 2023, 5:20 PM.\par \par [facility]: [address] [place] [address], [address] [place] [address]\par [phone] (voice), [phone] (fax) [de-identified] : The nasal septum is minimally deviated to the right. There are no masses or polyps and the nasal mucosa and secretions are normal. The choanae and posterior nasopharynx are normal without masses or drainage. The Eustachian tube orifices appear patent. The pharynx, including the posterior and lateral pharyngeal walls, the vallecula and base of tongue are normal without ulcerations, lesions or masses.  The lingual tonsils are hyperplastic with slight asymmetry right greater than left without discrete mass noted.  The hypopharynx including the pyriform sinuses open well without pooling of secretions, mucosal lesions or masses. The supraglottic larynx including the epiglottis, petiole, arytenoids, glossoepiglottic, aryepiglottic and pharyngoepiglottic folds are normal without mucosal lesions, ulcerations or masses. The glottis reveals normal false vocal folds. The true vocal folds are glistening white, tense and of equal length, without paralysis, having symmetric mobility on adduction and abduction. There are no mucosal lesions, nodules, cysts, erythroplasia or leukoplakia. The posterior cricoid area has healthy pink mucosa in the interarytenoid area and esophageal inlet. There is slight erythema posteriorly but no significant thickening/pachydermia of the interarytenoid mucosa to suggest posterior laryngitis from laryngopharyngeal acid reflux disease. The trachea is clear without narrowing in the immediate subglottic region, without deviation or lesions.  [de-identified] : followup parathyroidectomy and total thyroidectomy for PTC, URI and hoarsenss x 10 days with cough.

## 2023-09-29 ENCOUNTER — RX RENEWAL (OUTPATIENT)
Age: 53
End: 2023-09-29

## 2023-12-14 ENCOUNTER — APPOINTMENT (OUTPATIENT)
Dept: MAMMOGRAPHY | Facility: HOSPITAL | Age: 53
End: 2023-12-14

## 2023-12-14 ENCOUNTER — OUTPATIENT (OUTPATIENT)
Dept: OUTPATIENT SERVICES | Facility: HOSPITAL | Age: 53
LOS: 1 days | End: 2023-12-14
Payer: COMMERCIAL

## 2023-12-14 ENCOUNTER — APPOINTMENT (OUTPATIENT)
Dept: ULTRASOUND IMAGING | Facility: HOSPITAL | Age: 53
End: 2023-12-14
Payer: COMMERCIAL

## 2023-12-14 DIAGNOSIS — Z98.891 HISTORY OF UTERINE SCAR FROM PREVIOUS SURGERY: Chronic | ICD-10-CM

## 2023-12-14 DIAGNOSIS — Z90.89 ACQUIRED ABSENCE OF OTHER ORGANS: Chronic | ICD-10-CM

## 2023-12-14 DIAGNOSIS — Z98.890 OTHER SPECIFIED POSTPROCEDURAL STATES: Chronic | ICD-10-CM

## 2023-12-14 PROCEDURE — 76642 ULTRASOUND BREAST LIMITED: CPT | Mod: 26,LT

## 2023-12-14 PROCEDURE — G0279: CPT | Mod: 26

## 2023-12-14 PROCEDURE — G0279: CPT

## 2023-12-14 PROCEDURE — 76642 ULTRASOUND BREAST LIMITED: CPT

## 2023-12-14 PROCEDURE — 77065 DX MAMMO INCL CAD UNI: CPT | Mod: 26,LT

## 2023-12-14 PROCEDURE — 77065 DX MAMMO INCL CAD UNI: CPT

## 2023-12-21 ENCOUNTER — APPOINTMENT (OUTPATIENT)
Dept: ULTRASOUND IMAGING | Facility: HOSPITAL | Age: 53
End: 2023-12-21
Payer: COMMERCIAL

## 2023-12-21 ENCOUNTER — OUTPATIENT (OUTPATIENT)
Dept: OUTPATIENT SERVICES | Facility: HOSPITAL | Age: 53
LOS: 1 days | End: 2023-12-21
Payer: COMMERCIAL

## 2023-12-21 DIAGNOSIS — Z98.890 OTHER SPECIFIED POSTPROCEDURAL STATES: Chronic | ICD-10-CM

## 2023-12-21 DIAGNOSIS — Z98.891 HISTORY OF UTERINE SCAR FROM PREVIOUS SURGERY: Chronic | ICD-10-CM

## 2023-12-21 DIAGNOSIS — Z90.89 ACQUIRED ABSENCE OF OTHER ORGANS: Chronic | ICD-10-CM

## 2023-12-21 PROCEDURE — 88173 CYTOPATH EVAL FNA REPORT: CPT | Mod: 26

## 2023-12-21 PROCEDURE — 76942 ECHO GUIDE FOR BIOPSY: CPT

## 2023-12-21 PROCEDURE — 76942 ECHO GUIDE FOR BIOPSY: CPT | Mod: 26

## 2023-12-21 PROCEDURE — 88305 TISSUE EXAM BY PATHOLOGIST: CPT | Mod: 26

## 2023-12-21 PROCEDURE — 88305 TISSUE EXAM BY PATHOLOGIST: CPT

## 2023-12-21 PROCEDURE — 19000 PUNCTURE ASPIR CYST BREAST: CPT

## 2023-12-21 PROCEDURE — 19000 PUNCTURE ASPIR CYST BREAST: CPT | Mod: LT

## 2023-12-21 PROCEDURE — 88173 CYTOPATH EVAL FNA REPORT: CPT

## 2023-12-22 LAB
NON-GYNECOLOGICAL CYTOLOGY STUDY: SIGNIFICANT CHANGE UP
NON-GYNECOLOGICAL CYTOLOGY STUDY: SIGNIFICANT CHANGE UP

## 2024-03-26 ENCOUNTER — NON-APPOINTMENT (OUTPATIENT)
Age: 54
End: 2024-03-26

## 2024-03-27 ENCOUNTER — APPOINTMENT (OUTPATIENT)
Dept: GASTROENTEROLOGY | Facility: CLINIC | Age: 54
End: 2024-03-27
Payer: COMMERCIAL

## 2024-03-27 VITALS
OXYGEN SATURATION: 98 % | SYSTOLIC BLOOD PRESSURE: 130 MMHG | HEIGHT: 67 IN | TEMPERATURE: 96.8 F | WEIGHT: 170 LBS | HEART RATE: 65 BPM | DIASTOLIC BLOOD PRESSURE: 70 MMHG | RESPIRATION RATE: 14 BRPM | BODY MASS INDEX: 26.68 KG/M2

## 2024-03-27 DIAGNOSIS — K82.4 CHOLESTEROLOSIS OF GALLBLADDER: ICD-10-CM

## 2024-03-27 DIAGNOSIS — A04.8 OTHER SPECIFIED BACTERIAL INTESTINAL INFECTIONS: ICD-10-CM

## 2024-03-27 PROCEDURE — 99204 OFFICE O/P NEW MOD 45 MIN: CPT

## 2024-03-27 PROCEDURE — G2211 COMPLEX E/M VISIT ADD ON: CPT

## 2024-03-27 NOTE — ASSESSMENT
[FreeTextEntry1] : 53F with PMHx thyroid cancer June 2022 s/p total thyroidectomy referred by Dr. Canada for colon cancer screening.   Colon cancer screening  - schedule patient for colonoscopy @ [facility], r/a/i/b discussed and patient agreeable  - bowel prep instructions to be provided, MiraLAX - informed patient escort must pick patient up from procedure  - CBC/CMP today   Anemia - pt reports due to hx of heavy vaginal bleeding, last time iron studies checked was September 2022 therefore will rechecked again today - if still low will add EGD at time of colonoscopy  Hx of h. pylori infection - pt states never had eradication testing after treatment therefore obtain h. pylori breath test today   Gallbladder polyps - retrieve previous ultrasound for review, may need repeat  f/u after above

## 2024-03-27 NOTE — PHYSICAL EXAM
[Bowel Sounds] : normal bowel sounds [No Masses] : no abdominal mass palpated [Abdomen Tenderness] : non-tender [Abdomen Soft] : soft [Abnormal Walk] : normal gait [] : no rash [Normal Color / Pigmentation] : normal skin color and pigmentation [No Focal Deficits] : no focal deficits [Normal Turgor] : normal skin turgor [Oriented To Time, Place, And Person] : oriented to person, place, and time [Normal] : oriented to person, place, and time [Normal Mood] : the mood was normal [Normal Affect] : the affect was normal

## 2024-03-27 NOTE — HISTORY OF PRESENT ILLNESS
[FreeTextEntry1] : 53F with PMHx thyroid cancer 2022 s/p total thyroidectomy referred by Dr. Canada for colon cancer screening.   HPI- no GI complaints, knows she has been due but scared of anesthesia   [place] stool score- Type 3-4 Colon cancer screening- never  PMHx- gallbladder polyps, h. pylori 2021 and took antibiotics. never had eradication testing  PSHx-  10 years ago  Rx- iron for heavy vaginal bleeding, Synthroid  Supplements/herbs/OTC- A/c or NSAIDs? none  FHx- father diagnosed @ 64 with colon cancer and  within 6 months, paternal grandmother breast cancer  Allergies- NKDA  ETOH- wine, varies weekly  Smoking- none  Drugs- none   Labs/stool tests- 2023 CMP wnl, 2022 CBC H&H: 9.5/32.1% iron 5% ferritin 7 Breath tests- 2021 h. pylori positive  Imaging- no abdominal imaging  EGD- never Colonoscopy- never

## 2024-03-29 ENCOUNTER — TRANSCRIPTION ENCOUNTER (OUTPATIENT)
Age: 54
End: 2024-03-29

## 2024-03-29 LAB
ALBUMIN SERPL ELPH-MCNC: 4.6 G/DL
ALP BLD-CCNC: 43 U/L
ALT SERPL-CCNC: 20 U/L
ANION GAP SERPL CALC-SCNC: 11 MMOL/L
AST SERPL-CCNC: 15 U/L
BASOPHILS # BLD AUTO: 0.05 K/UL
BASOPHILS NFR BLD AUTO: 0.6 %
BILIRUB SERPL-MCNC: 0.3 MG/DL
BUN SERPL-MCNC: 23 MG/DL
CALCIUM SERPL-MCNC: 9.6 MG/DL
CHLORIDE SERPL-SCNC: 103 MMOL/L
CO2 SERPL-SCNC: 23 MMOL/L
CREAT SERPL-MCNC: 0.79 MG/DL
EGFR: 91 ML/MIN/1.73M2
EOSINOPHIL # BLD AUTO: 0.2 K/UL
EOSINOPHIL NFR BLD AUTO: 2.5 %
FERRITIN SERPL-MCNC: 44 NG/ML
GLUCOSE SERPL-MCNC: 104 MG/DL
HCT VFR BLD CALC: 41.3 %
HGB BLD-MCNC: 13.8 G/DL
IMM GRANULOCYTES NFR BLD AUTO: 0.4 %
IRON SATN MFR SERPL: 18 %
IRON SERPL-MCNC: 58 UG/DL
LYMPHOCYTES # BLD AUTO: 1.69 K/UL
LYMPHOCYTES NFR BLD AUTO: 21.4 %
MAN DIFF?: NORMAL
MCHC RBC-ENTMCNC: 30.5 PG
MCHC RBC-ENTMCNC: 33.4 GM/DL
MCV RBC AUTO: 91.4 FL
MONOCYTES # BLD AUTO: 0.47 K/UL
MONOCYTES NFR BLD AUTO: 5.9 %
NEUTROPHILS # BLD AUTO: 5.46 K/UL
NEUTROPHILS NFR BLD AUTO: 69.2 %
PLATELET # BLD AUTO: 210 K/UL
POTASSIUM SERPL-SCNC: 4.5 MMOL/L
PROT SERPL-MCNC: 7.4 G/DL
RBC # BLD: 4.52 M/UL
RBC # FLD: 12.6 %
SODIUM SERPL-SCNC: 137 MMOL/L
TIBC SERPL-MCNC: 328 UG/DL
UIBC SERPL-MCNC: 269 UG/DL
UREA BREATH TEST QL: NEGATIVE
WBC # FLD AUTO: 7.9 K/UL

## 2024-04-19 ENCOUNTER — OUTPATIENT (OUTPATIENT)
Dept: OUTPATIENT SERVICES | Facility: HOSPITAL | Age: 54
LOS: 1 days | End: 2024-04-19
Payer: COMMERCIAL

## 2024-04-19 ENCOUNTER — APPOINTMENT (OUTPATIENT)
Dept: MRI IMAGING | Facility: HOSPITAL | Age: 54
End: 2024-04-19

## 2024-04-19 DIAGNOSIS — Z90.89 ACQUIRED ABSENCE OF OTHER ORGANS: Chronic | ICD-10-CM

## 2024-04-19 DIAGNOSIS — Z98.890 OTHER SPECIFIED POSTPROCEDURAL STATES: Chronic | ICD-10-CM

## 2024-04-19 DIAGNOSIS — Z98.891 HISTORY OF UTERINE SCAR FROM PREVIOUS SURGERY: Chronic | ICD-10-CM

## 2024-04-19 PROCEDURE — C8937: CPT

## 2024-04-19 PROCEDURE — A9585: CPT

## 2024-04-19 PROCEDURE — 77049 MRI BREAST C-+ W/CAD BI: CPT | Mod: 26

## 2024-04-19 PROCEDURE — C8908: CPT

## 2024-04-23 ENCOUNTER — APPOINTMENT (OUTPATIENT)
Dept: ENDOCRINOLOGY | Facility: CLINIC | Age: 54
End: 2024-04-23
Payer: COMMERCIAL

## 2024-04-23 VITALS
WEIGHT: 172 LBS | HEART RATE: 78 BPM | BODY MASS INDEX: 26.94 KG/M2 | DIASTOLIC BLOOD PRESSURE: 89 MMHG | SYSTOLIC BLOOD PRESSURE: 143 MMHG

## 2024-04-23 PROCEDURE — 99214 OFFICE O/P EST MOD 30 MIN: CPT

## 2024-04-23 RX ORDER — DEXAMETHASONE 1 MG/1
1 TABLET ORAL
Qty: 1 | Refills: 0 | Status: ACTIVE | COMMUNITY
Start: 2024-04-23 | End: 1900-01-01

## 2024-04-23 NOTE — ASSESSMENT
[FreeTextEntry1] : Surgical hypothyroidism.  Papillary thyroid cancer, likely cured with surgery Goal TSH range 0.5-2.5. will check T3 today and if low (< 90), will try adding liothyronine to improve fatigue.  Liothyronine has half life ot 8 hours, so should be taking BID (in morning and early afternoon) and I would reduce levothyroxine  dose, or else TSH will become suppressed. OK to change to generic, or stay with Synthroid brand (either is ok).  Adrenal adenoma. repeat dex suppression test this year.  RTO 1 year

## 2024-04-23 NOTE — PHYSICAL EXAM
[Alert] : alert [Healthy Appearance] : healthy appearance [No Acute Distress] : no acute distress [No Proptosis] : no proptosis [No Lid Lag] : no lid lag [Normal Hearing] : hearing was normal [Well Healed Scar] : well healed scar [Clear to Auscultation] : lungs were clear to auscultation bilaterally [Normal S1, S2] : normal S1 and S2 [Regular Rhythm] : with a regular rhythm [No Stigmata of Cushings Syndrome] : no stigmata of Cushings Syndrome [Normal Affect] : the affect was normal [Normal Mood] : the mood was normal [Acanthosis Nigricans] : no acanthosis nigricans [de-identified] : thyroid not palpable [de-identified] : no plethora, moon facies, dorsal or supraclavicular fat pads

## 2024-04-23 NOTE — DATA REVIEWED
[FreeTextEntry1] : 12/22  TSH 0.40 11/22  TSH 0.15, Tg < 0.20, Tg Ab < 20  9/22  TSH 20.70, Tg < 0.20, Tg Ab < 20, Ca 8.5, PTH 25 8/22  TSH 26.50, Ca 9.5, PTH 24 6/22  TSH 3.77 3/21: A1c 5.5%, tot chol 192, trig 54, HDL 89, LDL 93, Ca 10.8, , 25D 27.3 1/21: Ca 11.0, , 25D 27.9, 1,25D 94.2.  normal SPEP. lelo/renin 11.3/3.2, normal met/normet.  TPO 15.2, Tg Ab < 20, TSH 3.32 12/20: Ca 11.3, K 4.3, ferritin 13, iron 22, 25D 25.8, TSH 5.47 11/20: Ca 11.2, A1c 5.5%, K 4.2, tot protin 7.3  bone density 3/21: no T scores reported?   normal Z scores 1/3 radius  CT, PE protocol, 11/20: L adrenal adenoma, appearing benign (with fat). 1.5cm

## 2024-04-23 NOTE — HISTORY OF PRESENT ILLNESS
[FreeTextEntry1] : She fees tired and weight has increased. She has been on progestin for vaginal bleeding but this will be discontinued soon.  weight gain may be related to progestin use, which she has been taking for past 6 months. no palpitations or feeling jittery  sometimes feels cold.  no constipation. no easy bruising.  maybe some moodiness. She is not exercising that much but is thinking  of restarting tennis.  PMH:  primary hyperpara s/p parathyroidectomy papillary thyroid cancer L adrenal adenoma 1.5cm  Meds Synthroid YVES 175mcg,  daily (since February, taking 7 tab/week instead of 6.5) supplements:  vitamin D 1000 IU/day, turmeric, lipoic acid, fucoidan, piperine [FRANK] : patient was not treated with radioactive iodine [de-identified] : 7/2022 [de-identified] : total thyroidectomy [de-identified] : papillary, classic variant, 1.4cm on R side.  no lymph or angioinvasion, no extrathyroid extension, margins negative;  parathyroid adenoma 1.3cm in R lower pole

## 2024-04-24 DIAGNOSIS — E89.0 POSTPROCEDURAL HYPOTHYROIDISM: ICD-10-CM

## 2024-04-24 DIAGNOSIS — R79.89 OTHER SPECIFIED ABNORMAL FINDINGS OF BLOOD CHEMISTRY: ICD-10-CM

## 2024-04-24 DIAGNOSIS — Z86.39 PERSONAL HISTORY OF OTHER ENDOCRINE, NUTRITIONAL AND METABOLIC DISEASE: ICD-10-CM

## 2024-04-24 DIAGNOSIS — E55.9 VITAMIN D DEFICIENCY, UNSPECIFIED: ICD-10-CM

## 2024-04-24 DIAGNOSIS — E03.9 HYPOTHYROIDISM, UNSPECIFIED: ICD-10-CM

## 2024-04-24 DIAGNOSIS — E04.2 NONTOXIC MULTINODULAR GOITER: ICD-10-CM

## 2024-04-24 DIAGNOSIS — Z86.018 PERSONAL HISTORY OF OTHER BENIGN NEOPLASM: ICD-10-CM

## 2024-04-24 LAB
ANION GAP SERPL CALC-SCNC: 14 MMOL/L
BUN SERPL-MCNC: 19 MG/DL
CALCIUM SERPL-MCNC: 8.7 MG/DL
CHLORIDE SERPL-SCNC: 105 MMOL/L
CO2 SERPL-SCNC: 22 MMOL/L
CREAT SERPL-MCNC: 0.74 MG/DL
EGFR: 98 ML/MIN/1.73M2
ESTIMATED AVERAGE GLUCOSE: 120 MG/DL
GLUCOSE SERPL-MCNC: 104 MG/DL
HBA1C MFR BLD HPLC: 5.8 %
POTASSIUM SERPL-SCNC: 4.2 MMOL/L
SODIUM SERPL-SCNC: 140 MMOL/L
T3 SERPL-MCNC: 100 NG/DL
T4 FREE SERPL-MCNC: 2 NG/DL
THYROGLOB AB SERPL-ACNC: <20 IU/ML
THYROGLOB SERPL-MCNC: <0.2 NG/ML
TSH SERPL-ACNC: 0.18 UIU/ML

## 2024-04-24 RX ORDER — LEVOTHYROXINE SODIUM 0.15 MG/1
150 TABLET ORAL DAILY
Qty: 90 | Refills: 1 | Status: ACTIVE | COMMUNITY
Start: 2022-09-14 | End: 1900-01-01

## 2024-04-24 RX ORDER — LEVOTHYROXINE SODIUM 175 UG/1
175 TABLET ORAL
Qty: 90 | Refills: 3 | Status: DISCONTINUED | COMMUNITY
Start: 2023-12-08 | End: 2024-04-24

## 2024-04-26 NOTE — ASSESSMENT
Patient was supplied with 4 sample boxes of Myrbetriq 50mg on 4/17/24   [FreeTextEntry1] : Data reviewed:\par \par CT chest 11/2020 and 12/2020 [facility], and neck CT ERMI 6/2022 all personally reviewed : the report of the neck CT notes a 3mm ground glass nodule at the R apex; to my eye there are actually 2 tiny GGOs in the RUL and in hindsight both are present in 12/2020 as well\par \par PFT 1/30/23: mild obstruction w sig BD response (FEV1 81-->94%), normal TLC & DLCO / FENO 76\par \par Impression:\par 2 tiny GGOs RUL\par Mild asthma\par Pneumonia/pneumonitis in 2020\par Papillary thyroid carcinoma, parathyroid adenoma, total thyroidectomy/parathyroidectomy 7/2022\par Never smoker, [place] exposure age 16\par \par Plan:\par For the nodules, we will follow x 5 years total.\par She already has 18 mos documented stability. Repeat scan in June 2024 and June 2026.\par Obtain ERMI neck scan and upload here.\par For the mild asthma, discussed options, will use Symbicort prn.\par

## 2024-05-28 RX ORDER — POLYETHYLENE GLYCOL 3350 17 G/17G
17 POWDER, FOR SOLUTION ORAL
Qty: 1 | Refills: 0 | Status: ACTIVE | COMMUNITY
Start: 2024-05-28 | End: 1900-01-01

## 2024-05-30 ENCOUNTER — APPOINTMENT (OUTPATIENT)
Age: 54
End: 2024-05-30
Payer: COMMERCIAL

## 2024-05-30 PROCEDURE — 45378 DIAGNOSTIC COLONOSCOPY: CPT | Mod: 33

## 2024-06-19 ENCOUNTER — APPOINTMENT (OUTPATIENT)
Dept: INTERNAL MEDICINE | Facility: CLINIC | Age: 54
End: 2024-06-19
Payer: COMMERCIAL

## 2024-06-19 ENCOUNTER — NON-APPOINTMENT (OUTPATIENT)
Age: 54
End: 2024-06-19

## 2024-06-19 ENCOUNTER — LABORATORY RESULT (OUTPATIENT)
Age: 54
End: 2024-06-19

## 2024-06-19 VITALS
BODY MASS INDEX: 26.53 KG/M2 | OXYGEN SATURATION: 96 % | HEART RATE: 93 BPM | TEMPERATURE: 97.2 F | WEIGHT: 169 LBS | HEIGHT: 67 IN | DIASTOLIC BLOOD PRESSURE: 62 MMHG | SYSTOLIC BLOOD PRESSURE: 93 MMHG

## 2024-06-19 DIAGNOSIS — Z12.11 ENCOUNTER FOR SCREENING FOR MALIGNANT NEOPLASM OF COLON: ICD-10-CM

## 2024-06-19 DIAGNOSIS — D64.9 ANEMIA, UNSPECIFIED: ICD-10-CM

## 2024-06-19 DIAGNOSIS — D35.02 BENIGN NEOPLASM OF LEFT ADRENAL GLAND: ICD-10-CM

## 2024-06-19 DIAGNOSIS — R91.8 OTHER NONSPECIFIC ABNORMAL FINDING OF LUNG FIELD: ICD-10-CM

## 2024-06-19 DIAGNOSIS — R03.0 ELEVATED BLOOD-PRESSURE READING, W/OUT DIAGNOSIS OF HYPERTENSION: ICD-10-CM

## 2024-06-19 DIAGNOSIS — Z00.00 ENCOUNTER FOR GENERAL ADULT MEDICAL EXAMINATION W/OUT ABNORMAL FINDINGS: ICD-10-CM

## 2024-06-19 DIAGNOSIS — E78.00 PURE HYPERCHOLESTEROLEMIA, UNSPECIFIED: ICD-10-CM

## 2024-06-19 DIAGNOSIS — Z12.39 ENCOUNTER FOR OTHER SCREENING FOR MALIGNANT NEOPLASM OF BREAST: ICD-10-CM

## 2024-06-19 DIAGNOSIS — R73.09 OTHER ABNORMAL GLUCOSE: ICD-10-CM

## 2024-06-19 PROCEDURE — 93000 ELECTROCARDIOGRAM COMPLETE: CPT

## 2024-06-19 PROCEDURE — 99396 PREV VISIT EST AGE 40-64: CPT

## 2024-06-19 PROCEDURE — 36415 COLL VENOUS BLD VENIPUNCTURE: CPT

## 2024-06-20 LAB
ALBUMIN SERPL ELPH-MCNC: 4.7 G/DL
ALP BLD-CCNC: 56 U/L
ALT SERPL-CCNC: 21 U/L
ANION GAP SERPL CALC-SCNC: 14 MMOL/L
AST SERPL-CCNC: 18 U/L
BILIRUB SERPL-MCNC: 0.5 MG/DL
BUN SERPL-MCNC: 18 MG/DL
CALCIUM SERPL-MCNC: 9.2 MG/DL
CHLORIDE SERPL-SCNC: 105 MMOL/L
CHOLEST SERPL-MCNC: 174 MG/DL
CO2 SERPL-SCNC: 21 MMOL/L
CREAT SERPL-MCNC: 0.72 MG/DL
EGFR: 101 ML/MIN/1.73M2
ESTIMATED AVERAGE GLUCOSE: 105 MG/DL
FERRITIN SERPL-MCNC: 36 NG/ML
GLUCOSE SERPL-MCNC: 86 MG/DL
HBA1C MFR BLD HPLC: 5.3 %
HCT VFR BLD CALC: 38.7 %
HDLC SERPL-MCNC: 80 MG/DL
HGB BLD-MCNC: 13 G/DL
LDLC SERPL CALC-MCNC: 82 MG/DL
MCHC RBC-ENTMCNC: 31 PG
MCHC RBC-ENTMCNC: 33.6 GM/DL
MCV RBC AUTO: 92.4 FL
NONHDLC SERPL-MCNC: 93 MG/DL
PLATELET # BLD AUTO: 191 K/UL
POTASSIUM SERPL-SCNC: 4.3 MMOL/L
PROT SERPL-MCNC: 7 G/DL
RBC # BLD: 4.19 M/UL
RBC # FLD: 12.7 %
SODIUM SERPL-SCNC: 140 MMOL/L
TRIGL SERPL-MCNC: 61 MG/DL
TSH SERPL-ACNC: 0.02 UIU/ML
WBC # FLD AUTO: 6.75 K/UL

## 2024-06-24 ENCOUNTER — APPOINTMENT (OUTPATIENT)
Dept: OTOLARYNGOLOGY | Facility: CLINIC | Age: 54
End: 2024-06-24
Payer: COMMERCIAL

## 2024-06-24 VITALS
DIASTOLIC BLOOD PRESSURE: 92 MMHG | BODY MASS INDEX: 26.99 KG/M2 | SYSTOLIC BLOOD PRESSURE: 158 MMHG | HEART RATE: 75 BPM | HEIGHT: 67 IN | TEMPERATURE: 98.1 F | WEIGHT: 171.96 LBS | OXYGEN SATURATION: 98 %

## 2024-06-24 DIAGNOSIS — E21.0 PRIMARY HYPERPARATHYROIDISM: ICD-10-CM

## 2024-06-24 DIAGNOSIS — R91.1 SOLITARY PULMONARY NODULE: ICD-10-CM

## 2024-06-24 DIAGNOSIS — R49.9 UNSPECIFIED VOICE AND RESONANCE DISORDER: ICD-10-CM

## 2024-06-24 DIAGNOSIS — Z98.890 OTHER SPECIFIED POSTPROCEDURAL STATES: ICD-10-CM

## 2024-06-24 DIAGNOSIS — C73 MALIGNANT NEOPLASM OF THYROID GLAND: ICD-10-CM

## 2024-06-24 DIAGNOSIS — E89.0 POSTPROCEDURAL HYPOTHYROIDISM: ICD-10-CM

## 2024-06-24 DIAGNOSIS — K21.9 ACUTE LARYNGITIS: ICD-10-CM

## 2024-06-24 DIAGNOSIS — J04.0 ACUTE LARYNGITIS: ICD-10-CM

## 2024-06-24 DIAGNOSIS — Z90.89 OTHER SPECIFIED POSTPROCEDURAL STATES: ICD-10-CM

## 2024-06-24 PROCEDURE — 31575 DIAGNOSTIC LARYNGOSCOPY: CPT

## 2024-06-24 PROCEDURE — 99215 OFFICE O/P EST HI 40 MIN: CPT | Mod: 25

## 2024-06-24 PROCEDURE — 76536 US EXAM OF HEAD AND NECK: CPT

## 2024-06-25 ENCOUNTER — APPOINTMENT (OUTPATIENT)
Dept: CT IMAGING | Facility: HOSPITAL | Age: 54
End: 2024-06-25

## 2024-06-25 ENCOUNTER — OUTPATIENT (OUTPATIENT)
Dept: OUTPATIENT SERVICES | Facility: HOSPITAL | Age: 54
LOS: 1 days | End: 2024-06-25
Payer: COMMERCIAL

## 2024-06-25 DIAGNOSIS — Z90.89 ACQUIRED ABSENCE OF OTHER ORGANS: Chronic | ICD-10-CM

## 2024-06-25 DIAGNOSIS — Z98.891 HISTORY OF UTERINE SCAR FROM PREVIOUS SURGERY: Chronic | ICD-10-CM

## 2024-06-25 DIAGNOSIS — Z98.890 OTHER SPECIFIED POSTPROCEDURAL STATES: Chronic | ICD-10-CM

## 2024-06-25 PROCEDURE — 71250 CT THORAX DX C-: CPT

## 2024-06-25 PROCEDURE — 71250 CT THORAX DX C-: CPT | Mod: 26

## 2024-06-26 ENCOUNTER — NON-APPOINTMENT (OUTPATIENT)
Age: 54
End: 2024-06-26

## 2024-06-26 RX ORDER — LIOTHYRONINE SODIUM 5 UG/1
5 TABLET ORAL
Qty: 180 | Refills: 1 | Status: DISCONTINUED | COMMUNITY
Start: 2024-04-24 | End: 2024-06-26

## 2024-07-05 ENCOUNTER — APPOINTMENT (OUTPATIENT)
Dept: PULMONOLOGY | Facility: CLINIC | Age: 54
End: 2024-07-05
Payer: COMMERCIAL

## 2024-07-05 DIAGNOSIS — R91.8 OTHER NONSPECIFIC ABNORMAL FINDING OF LUNG FIELD: ICD-10-CM

## 2024-07-05 PROCEDURE — 99213 OFFICE O/P EST LOW 20 MIN: CPT

## 2024-07-22 NOTE — ED ADULT NURSE NOTE - NSFALLRSKASSESSDT_ED_ALL_ED
Date: July 22, 2024  Time In: 1200  Time Out: 1230  This provider is located at home address for [facility] [address], [place] [address] using a secure [employer] Video Visit through [employer]. Patient is being seen remotely via telehealth at home address in [place] and stated they are in a secure environment for this session. The patient's condition being diagnosed/treated is appropriate for telemedicine. The provider identified herself as well as her credentials. The patient, and/or patients guardian, consent to be seen remotely, and when consent is given they understand that the consent allows for patient identifiable information to be sent to a third party as needed. They may refuse to be seen remotely at any time. The electronic data is encrypted and password protected, and the patient and/or guardian has been advised of the potential risks to privacy not withstanding such measures.     You have chosen to receive care through a telehealth visit.  Do you consent to use a video/audio connection for your medical care today? Yes    PROGRESS NOTE  Data:  Nivia Cagle is a 73 y.o. female who presents today for follow up    Chief Complaint: depression    History of Present Illness: Pt reports increased exhaustion and fatigue. Pt reports that she has been struggling with getting out of bed. Pt reports that despite sleep she remains tired. Pt reports that household chores have been neglected due to lack of motivation to complete them. Pt reports that she has neglected personal hygiene. Pt reports she has also reduced frequency of daily prayers. Pt explains she does not feel hopeless or dread and not having any crying spells. Pt reports that she doesn't feel depressed but understands that her behaviors say otherwise.       Clinical Maneuvering/Intervention:    (Scales based on 0 - 10 with 10 being the worst)  Depression: 5 Anxiety: 5        Assisted patient in processing above session content; acknowledged and normalized patient’s thoughts, feelings, and concerns.  Rationalized patient thought process regarding recent stressors and life events. Discussed triggers associated with patient's emotions. Also discussed coping skills for patient to implement. Discussed smart goals and creating a small task for each day. Discussed setting a time for a hour nap if needed. Discussed depression and how it does not always mean crying spells or depressed mood.     Allowed patient to freely discuss issues without interruption or judgment. Provided safe, confidential environment to facilitate the development of positive therapeutic relationship and encourage open, honest communication. Assisted patient in identifying risk factors which would indicate the need for higher level of care including thoughts to harm self or others and/or self-harming behavior and encouraged patient to contact this office, call 911, or present to the nearest emergency room should any of these events occur. Discussed crisis intervention services and means to access. Patient adamantly and convincingly denies current suicidal or homicidal ideation or perceptual disturbance.    Assessment:   Assessment   Patient appears to maintain relative stability as compared to their baseline.  However, patient continues to struggle with depression which continues to cause impairment in important areas of functioning.  A result, they can be reasonably expected to continue to benefit from treatment and would likely be at increased risk for decompensation otherwise.    Mental Status Exam:   Hygiene:   good  Cooperation:  Cooperative  Eye Contact:  Good  Psychomotor Behavior:  Appropriate  Affect:  Appropriate  Mood: sad  Speech:  Normal  Thought Process:  Linear  Thought Content:  Mood congruent  Suicidal:  None  Homicidal:  None  Hallucinations:  None  Delusion:  None  Memory:  Intact  Orientation:   Person, Place, Time and Situation  Reliability:  fair  Insight:  Fair  Judgement:  Fair  Impulse Control:  Fair  Physical/Medical Issues:  No        Patient's Support Network Includes:  son    Functional Status: Mild impairment     Progress toward goal: Not at goal    Prognosis: Fair with Ongoing Treatment            Plan:    Patient will continue in individual outpatient therapy with focus on improved functioning and coping skills, maintaining stability, and avoiding decompensation and the need for higher level of care.    Patient will adhere to medication regimen as prescribed and report any side effects. Patient will contact this office, call 911 or present to the nearest emergency room should suicidal or homicidal ideations occur. Provide Cognitive Behavioral Therapy and Solution Focused Therapy to improve functioning, maintain stability, and avoid decompensation and the need for higher level of care.     Return in about 4 weeks, or earlier if symptoms worsen or fail to improve.           VISIT DIAGNOSIS:     ICD-10-CM ICD-9-CM   1. Major depressive disorder, recurrent episode, moderate  F33.1 296.32        Diagnoses and all orders for this visit:    1. Major depressive disorder, recurrent episode, moderate (Primary)           [facility] No Show Policy:  We understand unexpected circumstances arise; however, anytime you miss your appointment we are unable to provide you appropriate care.  In addition, each appointment missed could have been used to provide care for others.  We ask that you call at least 24 hours in advance to cancel or reschedule an appointment.  We would like to take this opportunity to remind you of our policy stating patients who miss THREE or more appointments without cancelling or rescheduling 24 hours in advance of the appointment may be subject to cancellation of any further visits with our clinic and recommendation to seek in-person services/visits.    Please call  [phone] to reschedule your appointment. If there are reasons that make it difficult for you to keep the appointments, please call and let us know how we can help.  Please understand that medication prescribing will not continue without seeing your provider.      [facility]'s No Show Policy reviewed with patient at today's visit. Patient verbalized understanding of this policy. Discussed with patient that in the event that there are three or more no show visits, it will be recommended that they pursue in-person services/visits as noncompliance with telehealth visits indicates that patient is not an appropriate candidate for telemedicine and would likely be more appropriate for in-person services/visits. Patient verbalizes understanding and is agreeable to this.        This document has been electronically signed by Annita Knowles LCSW.  July 22, 2024 12:32 EDT      Part of this note may be an electronic transcription/translation of spoken language to printed text using the Dragon Dictation System.           09-Oct-2022 15:32

## 2024-08-14 ENCOUNTER — APPOINTMENT (OUTPATIENT)
Dept: PULMONOLOGY | Facility: CLINIC | Age: 54
End: 2024-08-14

## 2024-09-28 ENCOUNTER — LABORATORY RESULT (OUTPATIENT)
Age: 54
End: 2024-09-28

## 2024-09-30 LAB — TSH SERPL-ACNC: 4.51 UIU/ML

## 2024-10-02 ENCOUNTER — RX RENEWAL (OUTPATIENT)
Age: 54
End: 2024-10-02

## 2024-11-01 ENCOUNTER — APPOINTMENT (OUTPATIENT)
Dept: DERMATOLOGY | Facility: CLINIC | Age: 54
End: 2024-11-01
Payer: COMMERCIAL

## 2024-11-01 VITALS — BODY MASS INDEX: 27.34 KG/M2 | WEIGHT: 174.17 LBS | HEIGHT: 67 IN

## 2024-11-01 DIAGNOSIS — L70.0 ACNE VULGARIS: ICD-10-CM

## 2024-11-01 DIAGNOSIS — Z12.83 ENCOUNTER FOR SCREENING FOR MALIGNANT NEOPLASM OF SKIN: ICD-10-CM

## 2024-11-01 DIAGNOSIS — D22.9 MELANOCYTIC NEVI, UNSPECIFIED: ICD-10-CM

## 2024-11-01 PROCEDURE — 99204 OFFICE O/P NEW MOD 45 MIN: CPT

## 2024-11-02 PROBLEM — Z12.83 SCREENING EXAM FOR SKIN CANCER: Status: ACTIVE | Noted: 2024-11-02

## 2024-11-02 PROBLEM — D22.9 MULTIPLE BENIGN MELANOCYTIC NEVI: Status: ACTIVE | Noted: 2024-11-02

## 2024-11-04 RX ORDER — TRETINOIN 0.25 MG/G
0.03 CREAM TOPICAL
Qty: 1 | Refills: 6 | Status: ACTIVE | COMMUNITY
Start: 2024-11-01

## 2024-12-31 ENCOUNTER — EMERGENCY (EMERGENCY)
Facility: HOSPITAL | Age: 54
LOS: 1 days | Discharge: HOME | End: 2024-12-31
Attending: EMERGENCY MEDICINE | Admitting: EMERGENCY MEDICINE
Payer: COMMERCIAL

## 2024-12-31 VITALS
SYSTOLIC BLOOD PRESSURE: 143 MMHG | HEART RATE: 87 BPM | TEMPERATURE: 98 F | DIASTOLIC BLOOD PRESSURE: 84 MMHG | OXYGEN SATURATION: 98 % | RESPIRATION RATE: 16 BRPM

## 2024-12-31 VITALS
DIASTOLIC BLOOD PRESSURE: 78 MMHG | SYSTOLIC BLOOD PRESSURE: 127 MMHG | TEMPERATURE: 98 F | WEIGHT: 180.78 LBS | RESPIRATION RATE: 18 BRPM | OXYGEN SATURATION: 97 % | HEART RATE: 88 BPM

## 2024-12-31 DIAGNOSIS — Z98.890 OTHER SPECIFIED POSTPROCEDURAL STATES: Chronic | ICD-10-CM

## 2024-12-31 DIAGNOSIS — Z90.89 ACQUIRED ABSENCE OF OTHER ORGANS: Chronic | ICD-10-CM

## 2024-12-31 DIAGNOSIS — Z98.891 HISTORY OF UTERINE SCAR FROM PREVIOUS SURGERY: Chronic | ICD-10-CM

## 2024-12-31 LAB
ALBUMIN SERPL ELPH-MCNC: 4.3 G/DL — SIGNIFICANT CHANGE UP (ref 3.3–5)
ALP SERPL-CCNC: 47 U/L — SIGNIFICANT CHANGE UP (ref 40–120)
ALT FLD-CCNC: 17 U/L — SIGNIFICANT CHANGE UP (ref 10–45)
ANION GAP SERPL CALC-SCNC: 8 MMOL/L — SIGNIFICANT CHANGE UP (ref 5–17)
APTT BLD: 29.2 SEC — SIGNIFICANT CHANGE UP (ref 24.5–35.6)
AST SERPL-CCNC: 17 U/L — SIGNIFICANT CHANGE UP (ref 10–40)
BASOPHILS # BLD AUTO: 0.07 K/UL — SIGNIFICANT CHANGE UP (ref 0–0.2)
BASOPHILS NFR BLD AUTO: 1 % — SIGNIFICANT CHANGE UP (ref 0–2)
BILIRUB SERPL-MCNC: 0.2 MG/DL — SIGNIFICANT CHANGE UP (ref 0.2–1.2)
BLD GP AB SCN SERPL QL: NEGATIVE — SIGNIFICANT CHANGE UP
BUN SERPL-MCNC: 20 MG/DL — SIGNIFICANT CHANGE UP (ref 7–23)
CALCIUM SERPL-MCNC: 8.3 MG/DL — LOW (ref 8.4–10.5)
CHLORIDE SERPL-SCNC: 104 MMOL/L — SIGNIFICANT CHANGE UP (ref 96–108)
CO2 SERPL-SCNC: 25 MMOL/L — SIGNIFICANT CHANGE UP (ref 22–31)
CREAT SERPL-MCNC: 0.67 MG/DL — SIGNIFICANT CHANGE UP (ref 0.5–1.3)
EGFR: 105 ML/MIN/1.73M2 — SIGNIFICANT CHANGE UP
EOSINOPHIL # BLD AUTO: 0.29 K/UL — SIGNIFICANT CHANGE UP (ref 0–0.5)
EOSINOPHIL NFR BLD AUTO: 4.1 % — SIGNIFICANT CHANGE UP (ref 0–6)
GLUCOSE SERPL-MCNC: 103 MG/DL — HIGH (ref 70–99)
HCT VFR BLD CALC: 35.7 % — SIGNIFICANT CHANGE UP (ref 34.5–45)
HGB BLD-MCNC: 11.9 G/DL — SIGNIFICANT CHANGE UP (ref 11.5–15.5)
IMM GRANULOCYTES NFR BLD AUTO: 0.1 % — SIGNIFICANT CHANGE UP (ref 0–0.9)
INR BLD: 1.02 — SIGNIFICANT CHANGE UP (ref 0.85–1.16)
LYMPHOCYTES # BLD AUTO: 1.64 K/UL — SIGNIFICANT CHANGE UP (ref 1–3.3)
LYMPHOCYTES # BLD AUTO: 23 % — SIGNIFICANT CHANGE UP (ref 13–44)
MCHC RBC-ENTMCNC: 30.1 PG — SIGNIFICANT CHANGE UP (ref 27–34)
MCHC RBC-ENTMCNC: 33.3 G/DL — SIGNIFICANT CHANGE UP (ref 32–36)
MCV RBC AUTO: 90.4 FL — SIGNIFICANT CHANGE UP (ref 80–100)
MONOCYTES # BLD AUTO: 0.55 K/UL — SIGNIFICANT CHANGE UP (ref 0–0.9)
MONOCYTES NFR BLD AUTO: 7.7 % — SIGNIFICANT CHANGE UP (ref 2–14)
NEUTROPHILS # BLD AUTO: 4.58 K/UL — SIGNIFICANT CHANGE UP (ref 1.8–7.4)
NEUTROPHILS NFR BLD AUTO: 64.1 % — SIGNIFICANT CHANGE UP (ref 43–77)
NRBC # BLD: 0 /100 WBCS — SIGNIFICANT CHANGE UP (ref 0–0)
PLATELET # BLD AUTO: 213 K/UL — SIGNIFICANT CHANGE UP (ref 150–400)
POTASSIUM SERPL-MCNC: 3.8 MMOL/L — SIGNIFICANT CHANGE UP (ref 3.5–5.3)
POTASSIUM SERPL-SCNC: 3.8 MMOL/L — SIGNIFICANT CHANGE UP (ref 3.5–5.3)
PROT SERPL-MCNC: 6.9 G/DL — SIGNIFICANT CHANGE UP (ref 6–8.3)
PROTHROM AB SERPL-ACNC: 11.7 SEC — SIGNIFICANT CHANGE UP (ref 9.9–13.4)
RBC # BLD: 3.95 M/UL — SIGNIFICANT CHANGE UP (ref 3.8–5.2)
RBC # FLD: 12.9 % — SIGNIFICANT CHANGE UP (ref 10.3–14.5)
RH IG SCN BLD-IMP: POSITIVE — SIGNIFICANT CHANGE UP
SODIUM SERPL-SCNC: 137 MMOL/L — SIGNIFICANT CHANGE UP (ref 135–145)
WBC # BLD: 7.14 K/UL — SIGNIFICANT CHANGE UP (ref 3.8–10.5)
WBC # FLD AUTO: 7.14 K/UL — SIGNIFICANT CHANGE UP (ref 3.8–10.5)

## 2024-12-31 PROCEDURE — 36415 COLL VENOUS BLD VENIPUNCTURE: CPT

## 2024-12-31 PROCEDURE — 86850 RBC ANTIBODY SCREEN: CPT

## 2024-12-31 PROCEDURE — 76830 TRANSVAGINAL US NON-OB: CPT

## 2024-12-31 PROCEDURE — 76856 US EXAM PELVIC COMPLETE: CPT

## 2024-12-31 PROCEDURE — 93005 ELECTROCARDIOGRAM TRACING: CPT

## 2024-12-31 PROCEDURE — 85610 PROTHROMBIN TIME: CPT

## 2024-12-31 PROCEDURE — 86900 BLOOD TYPING SEROLOGIC ABO: CPT

## 2024-12-31 PROCEDURE — 76856 US EXAM PELVIC COMPLETE: CPT | Mod: 26

## 2024-12-31 PROCEDURE — 99285 EMERGENCY DEPT VISIT HI MDM: CPT | Mod: 25

## 2024-12-31 PROCEDURE — 86901 BLOOD TYPING SEROLOGIC RH(D): CPT

## 2024-12-31 PROCEDURE — 85025 COMPLETE CBC W/AUTO DIFF WBC: CPT

## 2024-12-31 PROCEDURE — 93010 ELECTROCARDIOGRAM REPORT: CPT

## 2024-12-31 PROCEDURE — 85730 THROMBOPLASTIN TIME PARTIAL: CPT

## 2024-12-31 PROCEDURE — 80053 COMPREHEN METABOLIC PANEL: CPT

## 2024-12-31 PROCEDURE — 99285 EMERGENCY DEPT VISIT HI MDM: CPT

## 2024-12-31 PROCEDURE — 76830 TRANSVAGINAL US NON-OB: CPT | Mod: 26

## 2024-12-31 NOTE — CONSULT NOTE ADULT - SUBJECTIVE AND OBJECTIVE BOX
55 yo  presents with vaginal bleeding that has worsening over the past three days. Per patient she has been using one pad/hour and today has gone through 5-6 pads, denies abdominal pain, shortness of breath, palpitations, endorses dizziness. Per patient 3 years ago she had similar episode and had D&C with obgyn which was wnl and patient was put on Slynd which she self discontinued in May 2024. In September she had her period which was heavier and then she got her menses again on 12/12 x 6-7 days. One week ago she started bleeding again and it became heavy, her obgyn is out of town which prompted her ED visit.   Pt denies fever, chills, chest pain, SOB, abdominal pain, nausea, vomiting, vaginal bleeding      OB/GYN Hx: pC/S , TOP D&C x2, Sab x1, menses [id number] x 5-6 days which started becoming irregular 3 years ago prompting D&C, patient states she was told she had fibroids in the past  PMHx: thyroid cancer s/p total thyroidectomy  SHx: total thyroidectomy, nose surgery, c/s, tonsillectomy  Meds: synthroid 150mcg  Allergies: NKDA    PHYSICAL EXAM:   Vital Signs Last 24 Hrs  T(C): 36.4 (31 Dec 2024 14:45), Max: 36.4 (31 Dec 2024 14:45)  T(F): 97.6 (31 Dec 2024 14:45), Max: 97.6 (31 Dec 2024 14:45)  HR: 88 (31 Dec 2024 14:45) (88 - 88)  BP: 127/78 (31 Dec 2024 14:45) (127/78 - 127/78)  BP(mean): --  RR: 18 (31 Dec 2024 14:45) (18 - 18)  SpO2: 97% (31 Dec 2024 14:45) (97% - 97%)    Parameters below as of 31 Dec 2024 14:45  Patient On (Oxygen Delivery Method): room air        **************************  Constitutional: Alert & Oriented x3, No acute distress  Respiratory: No increased WOB  Cardiovasular: VSS  Gastrointestinal: soft, non-tender  Pelvic exam: normal appearing external genitalia and vaginal mucosa, about 5cc of dark red blood in vaginal vault, cervix not actively bleeding, no bleeding with valsalva. No CMT, no adnexal masses or tenderness   Extremities: no calf tenderness or swelling      LABS:                        11.9   7.14  )-----------( 213      ( 31 Dec 2024 16:14 )             35.7         137  |  104  |  20  ----------------------------<  103[H]  3.8   |  25  |  0.67    Ca    8.3[L]      31 Dec 2024 16:14    TPro  6.9  /  Alb  4.3  /  TBili  0.2  /  DBili  x   /  AST  17  /  ALT  17  /  AlkPhos  47      PT/INR - ( 31 Dec 2024 16:14 )   PT: 11.7 sec;   INR: 1.02          PTT - ( 31 Dec 2024 16:14 )  PTT:29.2 sec  Urinalysis Basic - ( 31 Dec 2024 16:14 )    Color: x / Appearance: x / SG: x / pH: x  Gluc: 103 mg/dL / Ketone: x  / Bili: x / Urobili: x   Blood: x / Protein: x / Nitrite: x   Leuk Esterase: x / RBC: x / WBC x   Sq Epi: x / Non Sq Epi: x / Bacteria: x          RADIOLOGY & ADDITIONAL STUDIES: 55 yo  presents with vaginal bleeding that has worsening over the past three days. Per patient she has been using one pad/hour and today has gone through 5-6 pads, denies abdominal pain, shortness of breath, palpitations, endorses dizziness. Per patient 3 years ago she had similar episode and had D&C with obgyn which was wnl and patient was put on Slynd which she self discontinued in May 2024. In September she had her period which was heavier and then she got her menses again on 12/12 x 6-7 days. One week ago she started bleeding again and it became heavy, her obgyn is out of town which prompted her ED visit.   Pt denies fever, chills, chest pain, SOB, abdominal pain, nausea, vomiting, vaginal bleeding      OB/GYN Hx: pC/S , TOP D&C x2, Sab x1, menses [id number] x 5-6 days which started becoming irregular 3 years ago prompting D&C, patient states she was told she had fibroids in the past  PMHx: thyroid cancer s/p total thyroidectomy  SHx: total thyroidectomy, nose surgery, c/s, tonsillectomy  Meds: synthroid 150mcg  Allergies: NKDA    PHYSICAL EXAM:   Vital Signs Last 24 Hrs  T(C): 36.4 (31 Dec 2024 14:45), Max: 36.4 (31 Dec 2024 14:45)  T(F): 97.6 (31 Dec 2024 14:45), Max: 97.6 (31 Dec 2024 14:45)  HR: 88 (31 Dec 2024 14:45) (88 - 88)  BP: 127/78 (31 Dec 2024 14:45) (127/78 - 127/78)  BP(mean): --  RR: 18 (31 Dec 2024 14:45) (18 - 18)  SpO2: 97% (31 Dec 2024 14:45) (97% - 97%)    Parameters below as of 31 Dec 2024 14:45  Patient On (Oxygen Delivery Method): room air        **************************  Constitutional: Alert & Oriented x3, No acute distress  Respiratory: No increased WOB  Cardiovasular: VSS  Gastrointestinal: soft, non-tender  Pelvic exam: normal appearing external genitalia and vaginal mucosa, about 5cc of dark red blood in vaginal vault, cervix not actively bleeding, no bleeding with valsalva. No CMT, no adnexal masses or tenderness   Extremities: no calf tenderness or swelling      LABS:                        11.9   7.14  )-----------( 213      ( 31 Dec 2024 16:14 )             35.7         137  |  104  |  20  ----------------------------<  103[H]  3.8   |  25  |  0.67    Ca    8.3[L]      31 Dec 2024 16:14    TPro  6.9  /  Alb  4.3  /  TBili  0.2  /  DBili  x   /  AST  17  /  ALT  17  /  AlkPhos  47      PT/INR - ( 31 Dec 2024 16:14 )   PT: 11.7 sec;   INR: 1.02          PTT - ( 31 Dec 2024 16:14 )  PTT:29.2 sec  Urinalysis Basic - ( 31 Dec 2024 16:14 )    Color: x / Appearance: x / SG: x / pH: x  Gluc: 103 mg/dL / Ketone: x  / Bili: x / Urobili: x   Blood: x / Protein: x / Nitrite: x   Leuk Esterase: x / RBC: x / WBC x   Sq Epi: x / Non Sq Epi: x / Bacteria: x          RADIOLOGY & ADDITIONAL STUDIES:  INTERPRETATION:  CLINICAL INFORMATION: Menorrhagia. History of fibroids    LMP: 2024    COMPARISON: 2023.    TECHNIQUE:  Endovaginal and transabdominal pelvic sonogram. Color and Spectral   Doppler was performed.    FINDINGS:  Uterus: 11.0 cm x 5.9 cm x 6.0 cm. fibroids are seen. Posterior   intramural myoma measures 1.8 x 1.4 x 1.6 cm in the body. Subserosal   myoma measures 2.1 x 0.8 x 1.4 cm at the fundus. Anterior intramural   myoma measures 1.6 x 1.2 x 1.4 cm..  Endometrium: 4 mm. Within normal limits.    Right ovary: 2.6 cm x 1.4 cm x 2.0 cm. Within normal limits. Normal   arterial and venous waveforms.  Left ovary: 3.5 cm x 2.3 cm x 2.2 cm. Within normal limits. Normal   arterial and venous waveforms. Simple left adnexal cyst separate from the   ovary is seen measuring 3.6 x 2.6 x 4.2cm    Fluid: None.    IMPRESSION:  Fibroid uterus. None of the fibroids are clearly submucosal on this   study. Consider further evaluation with nonemergent MR imaging of the   pelvis  Simple appearing left adnexal cyst separate from the ovary measures up to   4.2 cm similar to prior study.    --- End of Report ---

## 2024-12-31 NOTE — ED PROVIDER NOTE - PHYSICAL EXAMINATION
CONSTITUTIONAL: Awake, alert.  Nontoxic, no acute distress.    HEAD: Normocephalic, atraumatic.    EYES: Conjunctivae clear without exudates or hemorrhage. Sclera is non-icteric.    ENT: Normal appearing external ears, nose, mucous membranes moist.    NECK: supple, trachea midline.    HEART:  Normal rate, regular rhythm.      LUNGS:  No acute respiratory distress.  Non-tachypneic and non-labored.      ABDOMEN: Normal appearing skin without lesions, rashes.  Normal bowel sounds x 4.  Soft, non-distended, non-tender in all four quadrants. No rebound or guarding. No hernias or masses palpable.  No pulsatile abdominal mass.   No CVA tenderness b/l.    GYN: Normal appearing external genitalia with dried blood and active bleeding. +pooling blood in vaginal vault.  no obv clot. unable to visualize cervix 2/2 bleeding.  no masses palpated, no cervical motion tenderness, no adnexal tenderness    MUSCULOSKELETAL:  Moving all extremities without issue.    SKIN: Skin in warm, dry and intact without rashes or lesions.  Appropriate color for ethnicity.    NEUROLOGICAL:  Patient is alert, oriented x person, place and time.    PSYCH: Appropriate mood and affect. Good judgment and insight.

## 2024-12-31 NOTE — ED ADULT NURSE NOTE - OBJECTIVE STATEMENT
Presents for 2 weeks of vaginal bleeding with 2 days described as heavy, ~1 pad hourly, with intermittent dizziness.     On assessment- AOx4, breathing even and unlabored on RA, no apparent distress, VSS in triage, able to speak in clear coherent sentences, steady gait unassisted, neuro intact with no apparent facial asymmetry, PERRLA. Escorted to GYN room for RASHEEDA cunningham.

## 2024-12-31 NOTE — ED PROVIDER NOTE - CLINICAL SUMMARY MEDICAL DECISION MAKING FREE TEXT BOX
53 y/o female w/ hx thyroid ca s/p resection p/w heavy vaginal bleeding.  States had no bleeding x 2 mo, then experienced bleeding on 12/12/24 for 6 days, then had no bleeding for a few days, then started bleeding heavily 1 wk ago.  Notes over past 4 days feels like changing her pad every hour and passing clots.  Notes some dizziness/weakness starting yesterday.  Denies f/c, cp, sob, abd pain, pelvic pain, dysuria.  Notes prior hx ovarian cysts at age 22, was told had fibroids during her pregnancy.  VSS  Exam with +pooling blood in vaginal vault.  no obv clot. unable to visualize cervix 2/2 bleeding.  no pain/ttp  Plan for labs, type and screen, tvus, ob/gyn consult for heavy bleeding, re-eval

## 2024-12-31 NOTE — ED PROVIDER NOTE - CARE PROVIDER_API CALL
Ailyn Lazcano  Obstetrics and Gynecology  [address], [address] [place] [address]  Phone: [phone]  Fax: [phone]  Follow Up Time:

## 2024-12-31 NOTE — CONSULT NOTE ADULT - ASSESSMENT
55 yo  presents with heavy vaginal bleeding in the setting of ?fibroids and perimenopause. Patient had similar symptoms three years ago for which she underwent a D&C and was put on progesterone only OCP. She self discontinued in May and has had two cycles since, her heavy bleeding started one week ago and got heavier over past three days prompting her visit.  -patient is currently hemodynamically and clinically stable, she is not tachycardic and Hg is 11.9, minimal dark blood on speculum exam  -if patient were to have heavy bleeding she has no contraindications to TXA  -plan for TVUS to assess for any polyps/fibroids that can be causing HMB  -patient could be having abnormal bleeding due to perimenopause and should follow up with outpatient OBGYN for thorough workup   -patient counseled on signs/symptoms of anemia including worsening dizziness, chest pain, palpitations, soaking through 1 pad/hour, passing large clots or any other signs/symptoms of anemia  -discussed with chief resident Dr. Ave COSTA PGY2 55 yo  presents with heavy vaginal bleeding in the setting of ?fibroids and perimenopause. Patient had similar symptoms three years ago for which she underwent a D&C and was put on progesterone only OCP. She self discontinued in May and has had two cycles since, her heavy bleeding started one week ago and got heavier over past three days prompting her visit.  -patient is currently hemodynamically and clinically stable, she is not tachycardic and Hg is 11.9, minimal dark blood on speculum exam  -if patient were to have heavy bleeding she has no contraindications to TXA  -plan for TVUS to assess for any polyps/fibroids that can be causing HMB  -patient could be having abnormal bleeding due to perimenopause and should follow up with outpatient OBGYN next week for thorough workup   -patient counseled on signs/symptoms of anemia including worsening dizziness, chest pain, palpitations, soaking through 1 pad/hour, passing large clots or any other signs/symptoms of anemia  -discussed with chief resident Dr. Dorado and attending Dr. Santiago COSTA PGY2 55 yo  presents with heavy vaginal bleeding in the setting of ?fibroids and perimenopause. Patient had similar symptoms three years ago for which she underwent a D&C and was put on progesterone only OCP. She self discontinued in May and has had two cycles since, her heavy bleeding started one week ago and got heavier over past three days prompting her visit.  -patient is currently hemodynamically and clinically stable, she is not tachycardic and Hg is 11.9, minimal dark blood on speculum exam  -if patient were to have heavy bleeding she has no contraindications to TXA  -plan for TVUS to assess for any polyps/fibroids that can be causing HMB  -patient could be having abnormal bleeding due to perimenopause and should follow up with outpatient OBGYN next week for thorough workup which includes EMB    -patient counseled on signs/symptoms of anemia including worsening dizziness, chest pain, palpitations, soaking through 1 pad/hour, passing large clots or any other signs/symptoms of anemia  -discussed with chief resident Dr. Dorado and attending Dr. Santiago COSTA PGY2 53 yo  presents with heavy vaginal bleeding in the setting of ?fibroids and perimenopause. Patient had similar symptoms three years ago for which she underwent a D&C and was put on progesterone only OCP. She self discontinued in May and has had two cycles since, her heavy bleeding started one week ago and got heavier over past three days prompting her visit.  -patient is currently hemodynamically and clinically stable, she is not tachycardic and Hg is 11.9, minimal dark blood on speculum exam  -if patient were to have heavy bleeding she has no contraindications to TXA  -plan for TVUS to assess for any polyps/fibroids that can be causing HMB  -patient could be having abnormal bleeding due to perimenopause and should follow up with outpatient OBGYN next week for thorough workup which includes EMB    -patient counseled on signs/symptoms of anemia including worsening dizziness, chest pain, palpitations, soaking through 1 pad/hour, passing large clots or any other signs/symptoms of anemia  -discussed with chief resident Dr. Dorado and attending Dr. Santiago COSTA PGY2      ADDNEDUM:   - TVUS demonstrated fibroid uterus. Posterior intramural myoma measures 1.8 x 1.4 x 1.6 cm in the body. Subserosal myoma measures 2.1 x 0.8 x 1.4 cm at the fundus. Anterior intramural myoma measures 1.6 x 1.2 x 1.4 cm. Simple appearing left adnexal cyst separate from ovary measures up to 4.2cm similar to prior study.   - Recommend plan as outlined above. AUB most likely secondary to fibroids vs. perimenopause. follow up with Dr. Lazcano within 1 week for further evaluation.     [place] PGY2

## 2024-12-31 NOTE — ED PROVIDER NOTE - CARE PLAN
1 Principal Discharge DX:	Menorrhagia   Principal Discharge DX:	Fibroid uterus  Secondary Diagnosis:	Perimenopause

## 2024-12-31 NOTE — ED PROVIDER NOTE - NSFOLLOWUPINSTRUCTIONS_ED_ALL_ED_FT
1. You were seen for  abnormal uterine bleeding and uterine fibroids . A copy of any of your resulted labs, imaging, and findings have been provided to you. Make sure to view any test results that may not have yet resulted at the time of your discharge by creating a FollowMyHealth account at: [email or web address] to sign up for FollowMyHealth.   2. Continue to take your home medications as prescribed.   3. Please follow up with your primary care physician. You may call our referrals coordinator at [phone] Monday to Friday 11am-7pm for assistance with making an appointment. Or you can call [phone] to make an appointment.  4. Return to the emergency department for new, persistent, or worsening symptoms or signs, or for any concerning symptoms.   5. For your for health, you should make healthy food choices and be physically active. Also, you should not smoke or use drugs, and you should not drink alcohol in excess. Please visit [email or web address] for resources and more information.    Abnormal Uterine Bleeding  A female body showing the reproductive organs, with a close-up view of the uterus and vagina.  Abnormal uterine bleeding is unusual bleeding from the uterus. It includes bleeding after sex, or bleeding or spotting between menstrual periods. It may also include bleeding that is heavier than normal, menstrual periods that last longer than usual, or bleeding that occurs after menopause.    Abnormal uterine bleeding can affect teenagers, women in their reproductive years, pregnant women, and women who have reached menopause. Common causes of abnormal uterine bleeding include:  Pregnancy.  Abnormal growths within the lining of the uterus (polyps).  Benign tumors or growths in the uterus (fibroids). These are not cancer.  Infection.  Cancer.  Too much or too little of some hormones in the body (hormonal imbalances).  Any type of abnormal bleeding should be checked by a health care provider. Many cases are minor and simple to treat, but others may be more serious. Treatment will depend on the cause of the bleeding and how severe it is.    Follow these instructions at home:  Medicines    Take over-the-counter and prescription medicines only as told by your health care provider.  Ask your health care provider about:  Taking medicines such as aspirin and ibuprofen. These medicines can thin your blood. Do not take these medicines unless your health care provider tells you to take them.  Taking over-the-counter medicines, vitamins, herbs, and supplements.  If you were prescribed iron pills, take them as told by your health care provider. Iron pills help to replace iron that your body loses because of this condition.  Managing constipation    In cases of severe bleeding, you may be asked to increase your iron intake to treat anemia. Doing this may cause constipation. To prevent or treat constipation, you may need to:  Drink enough fluid to keep your urine pale yellow.  Take over-the-counter or prescription medicines.  Eat foods that are high in fiber, such as beans, whole grains, and fresh fruits and vegetables.  Limit foods that are high in fat and processed sugars, such as fried or sweet foods.  Activity    Alter your activity to decrease bleeding if you need to change your sanitary pad more than one time every 2 hours:  Lie in bed with your feet raised (elevated).  Place a cold pack on your lower abdomen.  Rest as much as possible until the bleeding stops or slows down.  General instructions    Do not use tampons, douche, or have sex until your health care provider says these things are okay.  Change your sanitary pads often.  Get regular exams. These include pelvic exams and cervical cancer screenings.  It is up to you to get the results of any tests that are done. Ask your health care provider, or the department that is doing the tests, when your results will be ready.  Monitor your condition for any changes. For 2 months, write down:  When your menstrual period starts.  When your menstrual period ends.  When any abnormal vaginal bleeding occurs.  What problems you notice.  Keep all follow-up visits. This is important.  Contact a health care provider if:  You have bleeding that lasts for more than one week.  You feel dizzy at times.  You feel nauseous or you vomit.  You feel light-headed or weak.  You notice any other changes that show that your condition is getting worse.  Get help right away if:  You faint.  You have bleeding that soaks through a sanitary pad every hour.  You have pain in the abdomen.  You have a fever or chills.  You become sweaty or weak.  You pass large blood clots from your vagina.  These symptoms may represent a serious problem that is an emergency. Do not wait to see if the symptoms will go away. Get medical help right away. Call your local emergency services (911 in the [place] Do not drive yourself to the hospital.    Summary  Abnormal uterine bleeding is unusual bleeding from the uterus.  Any type of abnormal bleeding should be checked by a health care provider. Many cases are minor and simple to treat, but others may be more serious.  Treatment will depend on the cause of the bleeding and how severe it is.  Get help right away if you faint, you have bleeding that soaks through a sanitary pad every hour, or you pass large blood clots from your vagina.  This information is not intended to replace advice given to you by your health care provider. Make sure you discuss any questions you have with your health care provider.

## 2024-12-31 NOTE — ED ADULT TRIAGE NOTE - CHIEF COMPLAINT QUOTE
pt c/o heavy vaginal bleeding intermittently for 2 weeks. states for the past 3 days, she has been saturating 1 pad per hour.  +dizziness. denies syncope, pelvic pain.

## 2024-12-31 NOTE — ED PROVIDER NOTE - OBJECTIVE STATEMENT
55 y/o female w/ hx thyroid ca s/p resection p/w heavy vaginal bleeding.  States had no bleeding x 2 mo, then experienced bleeding on 12/12/24 for 6 days, then had no bleeding for a few days, then started bleeding heavily 1 wk ago.  Notes over past 4 days feels like changing her pad every hour and passing clots.  Notes some dizziness/weakness starting yesterday.  Denies f/c, cp, sob, abd pain, pelvic pain, dysuria.  Notes prior hx ovarian cysts at age 22, was told had fibroids during her pregnancy.

## 2024-12-31 NOTE — ED PROVIDER NOTE - PATIENT PORTAL LINK FT
You can access the FollowMyHealth Patient Portal offered by [facility] by registering at the following website: [email or web address]. By joining [facility]’s FollowMyHealth portal, you will also be able to view your health information using other applications (apps) compatible with our system.

## 2024-12-31 NOTE — ED PROVIDER NOTE - ATTENDING APP SHARED VISIT CONTRIBUTION OF CARE
Reviewed 4/17 OV note, Prescription approved per [facility] Refill Protocol.  Bia Marley RN    53 y/o female w/ hx thyroid ca s/p resection p/w heavy vaginal bleeding.  States had no bleeding x 2 mo, then experienced bleeding on 12/12/24 for 6 days, then had no bleeding for a few days, then started bleeding heavily 1 wk ago.  Notes over past 4 days feels like changing her pad every hour and passing clots.  Notes some dizziness/weakness starting yesterday.  Denies f/c, cp, sob, abd pain, pelvic pain, dysuria.  Notes prior hx ovarian cysts at age 22, was told had fibroids during her pregnancy.  VSS  Exam with +pooling blood in vaginal vault.  no obv clot. unable to visualize cervix 2/2 bleeding.  no pain/ttp  Plan for labs, type and screen, tvus, ob/gyn consult for heavy bleeding, re-eval    Addendum to attending statement: I have reviewed the ACP note and agree with the history, exam, and plan of care. I  was available to PA   as a supervising provider if needed. PA given opportunity to ask questions and request further evaluation / care.    Pt well appearing  Seen by GYN who states bleeding has stopped  HGB stable  Requests US for postmenopausal bleeding  Ordered from ED and signed out to Dr. Calles pending results

## 2024-12-31 NOTE — ED PROVIDER NOTE - PROGRESS NOTE DETAILS
- TVUS demonstrated fibroid uterus. Posterior intramural myoma measures 1.8 x 1.4 x 1.6 cm in the body. Subserosal myoma measures 2.1 x 0.8 x 1.4 cm at the fundus. Anterior intramural myoma measures 1.6 x 1.2 x 1.4 cm. Simple appearing left adnexal cyst separate from ovary measures up to 4.2cm similar to prior study.   - Per gyn Recommend outpt gyn f/u with Dr. Lazcano.. AUB most likely secondary to fibroids vs. perimenopause. follow up with Dr. Lazcano within 1 week for further evaluation.

## 2025-01-04 DIAGNOSIS — E89.0 POSTPROCEDURAL HYPOTHYROIDISM: ICD-10-CM

## 2025-01-04 DIAGNOSIS — N83.202 UNSPECIFIED OVARIAN CYST, LEFT SIDE: ICD-10-CM

## 2025-01-04 DIAGNOSIS — Z85.850 PERSONAL HISTORY OF MALIGNANT NEOPLASM OF THYROID: ICD-10-CM

## 2025-01-04 DIAGNOSIS — N93.9 ABNORMAL UTERINE AND VAGINAL BLEEDING, UNSPECIFIED: ICD-10-CM

## 2025-01-04 DIAGNOSIS — D25.2 SUBSEROSAL LEIOMYOMA OF UTERUS: ICD-10-CM

## 2025-01-04 DIAGNOSIS — D25.1 INTRAMURAL LEIOMYOMA OF UTERUS: ICD-10-CM

## 2025-03-10 ENCOUNTER — RX RENEWAL (OUTPATIENT)
Age: 55
End: 2025-03-10

## 2025-03-31 ENCOUNTER — APPOINTMENT (OUTPATIENT)
Dept: MAMMOGRAPHY | Facility: CLINIC | Age: 55
End: 2025-03-31
Payer: COMMERCIAL

## 2025-03-31 PROCEDURE — 77067 SCR MAMMO BI INCL CAD: CPT

## 2025-03-31 PROCEDURE — 76641 ULTRASOUND BREAST COMPLETE: CPT | Mod: 50

## 2025-03-31 PROCEDURE — 77063 BREAST TOMOSYNTHESIS BI: CPT

## 2025-06-24 ENCOUNTER — APPOINTMENT (OUTPATIENT)
Dept: OTOLARYNGOLOGY | Facility: CLINIC | Age: 55
End: 2025-06-24

## 2025-08-20 ENCOUNTER — APPOINTMENT (OUTPATIENT)
Dept: ENDOCRINOLOGY | Facility: CLINIC | Age: 55
End: 2025-08-20
Payer: COMMERCIAL

## 2025-08-20 ENCOUNTER — NON-APPOINTMENT (OUTPATIENT)
Age: 55
End: 2025-08-20

## 2025-08-20 VITALS
HEART RATE: 84 BPM | WEIGHT: 186 LBS | BODY MASS INDEX: 29.13 KG/M2 | SYSTOLIC BLOOD PRESSURE: 109 MMHG | DIASTOLIC BLOOD PRESSURE: 73 MMHG

## 2025-08-20 DIAGNOSIS — D35.02 BENIGN NEOPLASM OF LEFT ADRENAL GLAND: ICD-10-CM

## 2025-08-20 DIAGNOSIS — E89.0 POSTPROCEDURAL HYPOTHYROIDISM: ICD-10-CM

## 2025-08-20 DIAGNOSIS — C73 MALIGNANT NEOPLASM OF THYROID GLAND: ICD-10-CM

## 2025-08-20 PROCEDURE — 99214 OFFICE O/P EST MOD 30 MIN: CPT

## (undated) DEVICE — WARMING BLANKET LOWER ADULT

## (undated) DEVICE — SHEARS HARMONIC 1100 5MM X 20CM CURVED TIP

## (undated) DEVICE — VENODYNE/SCD SLEEVE CALF MEDIUM

## (undated) DEVICE — SAFETY PIN 1.5" MED

## (undated) DEVICE — SHEARS HARMONIC HD 1000I 5MM X 20CM CURVED TIP

## (undated) DEVICE — DRSG TELFA 3 X 8

## (undated) DEVICE — SHEARS HARMONIC ACE 5MM X 23CM CURVED TIP

## (undated) DEVICE — TUBING SUCTION 20FT

## (undated) DEVICE — DRAPE TOWEL BLUE 17" X 24"

## (undated) DEVICE — SAFETY PIN

## (undated) DEVICE — SYR LUER LOK 3CC

## (undated) DEVICE — BLADE SCALPEL SAFETY #15 WITH PLASTIC GREEN HANDLE

## (undated) DEVICE — PACK UPPER BODY

## (undated) DEVICE — SPONGE PEANUT AUTO COUNT

## (undated) DEVICE — PROBE PRASS SLIM MONOPOLAR STIMULATOR

## (undated) DEVICE — STAPLER SKIN PROXIMATE

## (undated) DEVICE — DRSG CURITY GAUZE SPONGE 4 X 4" 12-PLY

## (undated) DEVICE — SUT VICRYL 5-0 18" P-3 UNDYED

## (undated) DEVICE — DRSG TEGADERM 2.5X3"

## (undated) DEVICE — NDL HYPO REGULAR BEVEL 25G X 1.5" (BLUE)

## (undated) DEVICE — FOLEY TRAY 16FR 5CC LF UMETER CLOSED

## (undated) DEVICE — SUT SILK 2-0 30" PSL

## (undated) DEVICE — SUT SILK 2-0 12-18"

## (undated) DEVICE — DRAPE MAGNETIC INSTRUMENT MEDIUM

## (undated) DEVICE — GLV 7 PROTEXIS (WHITE)

## (undated) DEVICE — DRSG MASTISOL

## (undated) DEVICE — SUT PROLENE 4-0 18" P-3

## (undated) DEVICE — DRSG TEGADERM 4X4.75"

## (undated) DEVICE — DRAPE EENT SPLIT SHEET LARGE

## (undated) DEVICE — SUT SILK 3-0 18" TIES

## (undated) DEVICE — DRAPE INSTRUMENT POUCH 6.75" X 11"

## (undated) DEVICE — WARMING BLANKET UPPER ADULT

## (undated) DEVICE — SUCTION CATH ARGYLE DELEE TIP 10FR STRAIGHT PACKED

## (undated) DEVICE — POSITIONER PATIENT SAFETY STRAP 3X60"

## (undated) DEVICE — DRAIN JACKSON PRATT 10FR ROUND END W TROCAR

## (undated) DEVICE — SPONGE ENDO PEANUT 5MM

## (undated) DEVICE — MARKING PEN DEVON DUAL TIP W RULER

## (undated) DEVICE — PREP BETADINE SPONGE STICKS

## (undated) DEVICE — DRAPE FLUID WARMER 44 X 66"

## (undated) DEVICE — DRSG STERISTRIPS 0.25 X 4"